# Patient Record
Sex: MALE | Race: WHITE | Employment: FULL TIME | URBAN - METROPOLITAN AREA
[De-identification: names, ages, dates, MRNs, and addresses within clinical notes are randomized per-mention and may not be internally consistent; named-entity substitution may affect disease eponyms.]

---

## 2021-01-01 ENCOUNTER — APPOINTMENT (OUTPATIENT)
Dept: GENERAL RADIOLOGY | Age: 56
DRG: 871 | End: 2021-01-01
Payer: COMMERCIAL

## 2021-01-01 ENCOUNTER — ANESTHESIA EVENT (OUTPATIENT)
Dept: ICU | Age: 56
DRG: 871 | End: 2021-01-01
Payer: COMMERCIAL

## 2021-01-01 ENCOUNTER — HOSPITAL ENCOUNTER (OUTPATIENT)
Dept: INFUSION THERAPY | Age: 56
Discharge: HOME | End: 2021-11-03
Attending: INTERNAL MEDICINE

## 2021-01-01 ENCOUNTER — HOSPITAL ENCOUNTER (INPATIENT)
Age: 56
LOS: 8 days | DRG: 871 | End: 2021-11-11
Attending: EMERGENCY MEDICINE
Payer: COMMERCIAL

## 2021-01-01 ENCOUNTER — ANESTHESIA (OUTPATIENT)
Dept: ICU | Age: 56
DRG: 871 | End: 2021-01-01
Payer: COMMERCIAL

## 2021-01-01 VITALS
TEMPERATURE: 101.5 F | OXYGEN SATURATION: 84 % | BODY MASS INDEX: 39.85 KG/M2 | HEART RATE: 113 BPM | DIASTOLIC BLOOD PRESSURE: 85 MMHG | WEIGHT: 294.2 LBS | HEIGHT: 72 IN | RESPIRATION RATE: 48 BRPM | SYSTOLIC BLOOD PRESSURE: 110 MMHG

## 2021-01-01 DIAGNOSIS — U07.1 COVID-19: Primary | ICD-10-CM

## 2021-01-01 DIAGNOSIS — E87.1 HYPONATREMIA: ICD-10-CM

## 2021-01-01 DIAGNOSIS — U07.1 COVID-19: ICD-10-CM

## 2021-01-01 DIAGNOSIS — R09.02 HYPOXIA: ICD-10-CM

## 2021-01-01 LAB
-: NORMAL
ABSOLUTE EOS #: 0 K/UL (ref 0–0.4)
ABSOLUTE EOS #: 0 K/UL (ref 0–0.4)
ABSOLUTE EOS #: 0 K/UL (ref 0–0.44)
ABSOLUTE EOS #: <0.03 K/UL (ref 0–0.44)
ABSOLUTE EOS #: <0.03 K/UL (ref 0–0.44)
ABSOLUTE IMMATURE GRANULOCYTE: 0 K/UL (ref 0–0.3)
ABSOLUTE IMMATURE GRANULOCYTE: 0.03 K/UL (ref 0–0.3)
ABSOLUTE IMMATURE GRANULOCYTE: 0.1 K/UL (ref 0–0.3)
ABSOLUTE IMMATURE GRANULOCYTE: 0.16 K/UL (ref 0–0.3)
ABSOLUTE IMMATURE GRANULOCYTE: 0.18 K/UL (ref 0–0.3)
ABSOLUTE LYMPH #: 0.36 K/UL (ref 1–4.8)
ABSOLUTE LYMPH #: 0.64 K/UL (ref 1.1–3.7)
ABSOLUTE LYMPH #: 0.68 K/UL (ref 1.1–3.7)
ABSOLUTE LYMPH #: 0.7 K/UL (ref 1.1–3.7)
ABSOLUTE LYMPH #: 0.8 K/UL (ref 1.1–3.7)
ABSOLUTE LYMPH #: 0.8 K/UL (ref 1.1–3.7)
ABSOLUTE LYMPH #: 0.82 K/UL (ref 1–4.8)
ABSOLUTE LYMPH #: 1.2 K/UL (ref 1.1–3.7)
ABSOLUTE MONO #: 0.2 K/UL (ref 0.1–0.8)
ABSOLUTE MONO #: 0.21 K/UL (ref 0.1–1.2)
ABSOLUTE MONO #: 0.21 K/UL (ref 0.1–1.2)
ABSOLUTE MONO #: 0.3 K/UL (ref 0.1–1.2)
ABSOLUTE MONO #: 0.46 K/UL (ref 0.1–0.8)
ABSOLUTE MONO #: 0.8 K/UL (ref 0.1–1.2)
ABSOLUTE MONO #: 0.88 K/UL (ref 0.1–1.2)
ABSOLUTE MONO #: 0.9 K/UL (ref 0.1–1.2)
ALBUMIN SERPL-MCNC: 3.2 G/DL (ref 3.5–5.2)
ALBUMIN SERPL-MCNC: 3.3 G/DL (ref 3.5–5.2)
ALBUMIN SERPL-MCNC: 3.5 G/DL (ref 3.5–5.2)
ALBUMIN SERPL-MCNC: 3.6 G/DL (ref 3.5–5.2)
ALBUMIN SERPL-MCNC: 3.6 G/DL (ref 3.5–5.2)
ALBUMIN SERPL-MCNC: 3.7 G/DL (ref 3.5–5.2)
ALBUMIN SERPL-MCNC: 3.7 G/DL (ref 3.5–5.2)
ALBUMIN/GLOBULIN RATIO: 0.9 (ref 1–2.5)
ALBUMIN/GLOBULIN RATIO: 1 (ref 1–2.5)
ALBUMIN/GLOBULIN RATIO: 1.1 (ref 1–2.5)
ALBUMIN/GLOBULIN RATIO: 1.2 (ref 1–2.5)
ALBUMIN/GLOBULIN RATIO: 1.3 (ref 1–2.5)
ALBUMIN/GLOBULIN RATIO: 1.4 (ref 1–2.5)
ALBUMIN/GLOBULIN RATIO: 1.4 (ref 1–2.5)
ALLEN TEST: POSITIVE
ALP BLD-CCNC: 100 U/L (ref 40–129)
ALP BLD-CCNC: 104 U/L (ref 40–129)
ALP BLD-CCNC: 52 U/L (ref 40–129)
ALP BLD-CCNC: 62 U/L (ref 40–129)
ALP BLD-CCNC: 94 U/L (ref 40–129)
ALP BLD-CCNC: 96 U/L (ref 40–129)
ALP BLD-CCNC: 96 U/L (ref 40–129)
ALT SERPL-CCNC: 121 U/L (ref 5–41)
ALT SERPL-CCNC: 134 U/L (ref 5–41)
ALT SERPL-CCNC: 161 U/L (ref 5–41)
ALT SERPL-CCNC: 188 U/L (ref 5–41)
ALT SERPL-CCNC: 236 U/L (ref 5–41)
ALT SERPL-CCNC: 238 U/L (ref 5–41)
ALT SERPL-CCNC: 91 U/L (ref 5–41)
ANION GAP SERPL CALCULATED.3IONS-SCNC: 11 MMOL/L (ref 9–17)
ANION GAP SERPL CALCULATED.3IONS-SCNC: 13 MMOL/L (ref 9–17)
ANION GAP SERPL CALCULATED.3IONS-SCNC: 13 MMOL/L (ref 9–17)
ANION GAP SERPL CALCULATED.3IONS-SCNC: 14 MMOL/L (ref 9–17)
ANION GAP SERPL CALCULATED.3IONS-SCNC: 15 MMOL/L (ref 9–17)
ANION GAP SERPL CALCULATED.3IONS-SCNC: 15 MMOL/L (ref 9–17)
ANION GAP SERPL CALCULATED.3IONS-SCNC: 16 MMOL/L (ref 9–17)
ANION GAP SERPL CALCULATED.3IONS-SCNC: 16 MMOL/L (ref 9–17)
AST SERPL-CCNC: 152 U/L
AST SERPL-CCNC: 188 U/L
AST SERPL-CCNC: 222 U/L
AST SERPL-CCNC: 237 U/L
AST SERPL-CCNC: 257 U/L
AST SERPL-CCNC: 73 U/L
AST SERPL-CCNC: 93 U/L
BASOPHILS # BLD: 0 % (ref 0–2)
BASOPHILS ABSOLUTE: 0 K/UL (ref 0–0.2)
BASOPHILS ABSOLUTE: <0.03 K/UL (ref 0–0.2)
BASOPHILS ABSOLUTE: <0.03 K/UL (ref 0–0.2)
BILIRUB SERPL-MCNC: 0.44 MG/DL (ref 0.3–1.2)
BILIRUB SERPL-MCNC: 0.48 MG/DL (ref 0.3–1.2)
BILIRUB SERPL-MCNC: 0.51 MG/DL (ref 0.3–1.2)
BILIRUB SERPL-MCNC: 0.61 MG/DL (ref 0.3–1.2)
BILIRUB SERPL-MCNC: 0.65 MG/DL (ref 0.3–1.2)
BILIRUB SERPL-MCNC: 0.69 MG/DL (ref 0.3–1.2)
BILIRUB SERPL-MCNC: 0.79 MG/DL (ref 0.3–1.2)
BUN BLDV-MCNC: 14 MG/DL (ref 6–20)
BUN BLDV-MCNC: 15 MG/DL (ref 6–20)
BUN BLDV-MCNC: 16 MG/DL (ref 6–20)
BUN BLDV-MCNC: 17 MG/DL (ref 6–20)
BUN BLDV-MCNC: 18 MG/DL (ref 6–20)
BUN BLDV-MCNC: 18 MG/DL (ref 6–20)
BUN BLDV-MCNC: 21 MG/DL (ref 6–20)
BUN BLDV-MCNC: 21 MG/DL (ref 6–20)
BUN/CREAT BLD: ABNORMAL (ref 9–20)
C-REACTIVE PROTEIN: 24.2 MG/L (ref 0–5)
C-REACTIVE PROTEIN: 86.2 MG/L (ref 0–5)
CALCIUM SERPL-MCNC: 8.1 MG/DL (ref 8.6–10.4)
CALCIUM SERPL-MCNC: 8.2 MG/DL (ref 8.6–10.4)
CALCIUM SERPL-MCNC: 8.2 MG/DL (ref 8.6–10.4)
CALCIUM SERPL-MCNC: 8.3 MG/DL (ref 8.6–10.4)
CALCIUM SERPL-MCNC: 8.3 MG/DL (ref 8.6–10.4)
CALCIUM SERPL-MCNC: 8.6 MG/DL (ref 8.6–10.4)
CALCIUM SERPL-MCNC: 8.6 MG/DL (ref 8.6–10.4)
CALCIUM SERPL-MCNC: 8.7 MG/DL (ref 8.6–10.4)
CHLORIDE BLD-SCNC: 87 MMOL/L (ref 98–107)
CHLORIDE BLD-SCNC: 88 MMOL/L (ref 98–107)
CHLORIDE BLD-SCNC: 89 MMOL/L (ref 98–107)
CHLORIDE BLD-SCNC: 89 MMOL/L (ref 98–107)
CHLORIDE BLD-SCNC: 90 MMOL/L (ref 98–107)
CHLORIDE BLD-SCNC: 93 MMOL/L (ref 98–107)
CHLORIDE BLD-SCNC: 93 MMOL/L (ref 98–107)
CHLORIDE BLD-SCNC: 94 MMOL/L (ref 98–107)
CO2: 21 MMOL/L (ref 20–31)
CO2: 22 MMOL/L (ref 20–31)
CO2: 23 MMOL/L (ref 20–31)
CO2: 24 MMOL/L (ref 20–31)
CO2: 25 MMOL/L (ref 20–31)
CO2: 26 MMOL/L (ref 20–31)
CREAT SERPL-MCNC: 0.77 MG/DL (ref 0.7–1.2)
CREAT SERPL-MCNC: 0.8 MG/DL (ref 0.7–1.2)
CREAT SERPL-MCNC: 0.85 MG/DL (ref 0.7–1.2)
CREAT SERPL-MCNC: 0.86 MG/DL (ref 0.7–1.2)
CREAT SERPL-MCNC: 0.9 MG/DL (ref 0.7–1.2)
CREAT SERPL-MCNC: 0.9 MG/DL (ref 0.7–1.2)
CREAT SERPL-MCNC: 0.91 MG/DL (ref 0.7–1.2)
CREAT SERPL-MCNC: 1.16 MG/DL (ref 0.7–1.2)
D-DIMER QUANTITATIVE: 0.93 MG/L FEU
DIFFERENTIAL TYPE: ABNORMAL
EKG ATRIAL RATE: 84 BPM
EKG P AXIS: 64 DEGREES
EKG P-R INTERVAL: 156 MS
EKG Q-T INTERVAL: 378 MS
EKG QRS DURATION: 84 MS
EKG QTC CALCULATION (BAZETT): 446 MS
EKG R AXIS: -12 DEGREES
EKG T AXIS: 34 DEGREES
EKG VENTRICULAR RATE: 84 BPM
EOSINOPHILS RELATIVE PERCENT: 0 % (ref 1–4)
FERRITIN: 2936 UG/L (ref 30–400)
FIBRINOGEN: 538 MG/DL (ref 140–420)
FIO2: 70
GFR AFRICAN AMERICAN: >60 ML/MIN
GFR NON-AFRICAN AMERICAN: >60 ML/MIN
GFR SERPL CREATININE-BSD FRML MDRD: ABNORMAL ML/MIN/{1.73_M2}
GLUCOSE BLD-MCNC: 107 MG/DL (ref 70–99)
GLUCOSE BLD-MCNC: 110 MG/DL (ref 70–99)
GLUCOSE BLD-MCNC: 120 MG/DL (ref 70–99)
GLUCOSE BLD-MCNC: 132 MG/DL (ref 70–99)
GLUCOSE BLD-MCNC: 135 MG/DL (ref 70–99)
GLUCOSE BLD-MCNC: 137 MG/DL (ref 70–99)
GLUCOSE BLD-MCNC: 142 MG/DL (ref 70–99)
GLUCOSE BLD-MCNC: 95 MG/DL (ref 70–99)
HCT VFR BLD CALC: 46.7 % (ref 40.7–50.3)
HCT VFR BLD CALC: 46.8 % (ref 40.7–50.3)
HCT VFR BLD CALC: 47.1 % (ref 40.7–50.3)
HCT VFR BLD CALC: 47.1 % (ref 40.7–50.3)
HCT VFR BLD CALC: 47.4 % (ref 40.7–50.3)
HCT VFR BLD CALC: 47.7 % (ref 40.7–50.3)
HCT VFR BLD CALC: 49.9 % (ref 40.7–50.3)
HCT VFR BLD CALC: 50.2 % (ref 40.7–50.3)
HEMOGLOBIN: 15.5 G/DL (ref 13–17)
HEMOGLOBIN: 15.6 G/DL (ref 13–17)
HEMOGLOBIN: 15.6 G/DL (ref 13–17)
HEMOGLOBIN: 15.8 G/DL (ref 13–17)
HEMOGLOBIN: 15.8 G/DL (ref 13–17)
HEMOGLOBIN: 16 G/DL (ref 13–17)
HEMOGLOBIN: 16.6 G/DL (ref 13–17)
HEMOGLOBIN: 17.1 G/DL (ref 13–17)
IMMATURE GRANULOCYTES: 0 %
IMMATURE GRANULOCYTES: 1 %
INR BLD: 0.9
LACTATE DEHYDROGENASE: 794 U/L (ref 135–225)
LACTIC ACID, WHOLE BLOOD: 2.9 MMOL/L (ref 0.7–2.1)
LACTIC ACID: ABNORMAL MMOL/L
LYMPHOCYTES # BLD: 13 % (ref 24–43)
LYMPHOCYTES # BLD: 23 % (ref 24–43)
LYMPHOCYTES # BLD: 25 % (ref 24–43)
LYMPHOCYTES # BLD: 4 % (ref 24–43)
LYMPHOCYTES # BLD: 4 % (ref 24–43)
LYMPHOCYTES # BLD: 4 % (ref 24–44)
LYMPHOCYTES # BLD: 6 % (ref 24–43)
LYMPHOCYTES # BLD: 8 % (ref 24–44)
MAGNESIUM: 2.4 MG/DL (ref 1.6–2.6)
MAGNESIUM: 2.5 MG/DL (ref 1.6–2.6)
MCH RBC QN AUTO: 29.8 PG (ref 25.2–33.5)
MCH RBC QN AUTO: 29.8 PG (ref 25.2–33.5)
MCH RBC QN AUTO: 30 PG (ref 25.2–33.5)
MCH RBC QN AUTO: 30.2 PG (ref 25.2–33.5)
MCH RBC QN AUTO: 30.3 PG (ref 25.2–33.5)
MCH RBC QN AUTO: 30.4 PG (ref 25.2–33.5)
MCHC RBC AUTO-ENTMCNC: 32.9 G/DL (ref 28.4–34.8)
MCHC RBC AUTO-ENTMCNC: 32.9 G/DL (ref 28.4–34.8)
MCHC RBC AUTO-ENTMCNC: 33.1 G/DL (ref 28.4–34.8)
MCHC RBC AUTO-ENTMCNC: 33.4 G/DL (ref 28.4–34.8)
MCHC RBC AUTO-ENTMCNC: 33.5 G/DL (ref 28.4–34.8)
MCHC RBC AUTO-ENTMCNC: 33.5 G/DL (ref 28.4–34.8)
MCHC RBC AUTO-ENTMCNC: 33.8 G/DL (ref 28.4–34.8)
MCHC RBC AUTO-ENTMCNC: 34.3 G/DL (ref 28.4–34.8)
MCV RBC AUTO: 88 FL (ref 82.6–102.9)
MCV RBC AUTO: 89.8 FL (ref 82.6–102.9)
MCV RBC AUTO: 89.8 FL (ref 82.6–102.9)
MCV RBC AUTO: 90.3 FL (ref 82.6–102.9)
MCV RBC AUTO: 90.6 FL (ref 82.6–102.9)
MCV RBC AUTO: 91.6 FL (ref 82.6–102.9)
MODE: ABNORMAL
MONOCYTES # BLD: 11 % (ref 3–12)
MONOCYTES # BLD: 2 % (ref 1–7)
MONOCYTES # BLD: 3 % (ref 3–12)
MONOCYTES # BLD: 4 % (ref 3–12)
MONOCYTES # BLD: 5 % (ref 1–7)
MONOCYTES # BLD: 5 % (ref 3–12)
MONOCYTES # BLD: 5 % (ref 3–12)
MONOCYTES # BLD: 7 % (ref 3–12)
MORPHOLOGY: NORMAL
NEGATIVE BASE EXCESS, ART: ABNORMAL (ref 0–2)
NRBC AUTOMATED: 0 PER 100 WBC
O2 DEVICE/FLOW/%: ABNORMAL
PATIENT TEMP: ABNORMAL
PDW BLD-RTO: 13.5 % (ref 11.8–14.4)
PDW BLD-RTO: 13.6 % (ref 11.8–14.4)
PDW BLD-RTO: 13.7 % (ref 11.8–14.4)
PDW BLD-RTO: 13.7 % (ref 11.8–14.4)
PLATELET # BLD: 141 K/UL (ref 138–453)
PLATELET # BLD: 170 K/UL (ref 138–453)
PLATELET # BLD: 211 K/UL (ref 138–453)
PLATELET # BLD: 238 K/UL (ref 138–453)
PLATELET # BLD: 253 K/UL (ref 138–453)
PLATELET # BLD: 75 K/UL (ref 138–453)
PLATELET # BLD: 94 K/UL (ref 138–453)
PLATELET # BLD: ABNORMAL K/UL (ref 138–453)
PLATELET ESTIMATE: ABNORMAL
PLATELET, FLUORESCENCE: 134 K/UL (ref 138–453)
PLATELET, IMMATURE FRACTION: 8.7 % (ref 1.1–10.3)
PMV BLD AUTO: 11.2 FL (ref 8.1–13.5)
PMV BLD AUTO: 11.2 FL (ref 8.1–13.5)
PMV BLD AUTO: 11.4 FL (ref 8.1–13.5)
PMV BLD AUTO: 11.8 FL (ref 8.1–13.5)
PMV BLD AUTO: 12.3 FL (ref 8.1–13.5)
PMV BLD AUTO: 12.3 FL (ref 8.1–13.5)
PMV BLD AUTO: 12.9 FL (ref 8.1–13.5)
PMV BLD AUTO: ABNORMAL FL (ref 8.1–13.5)
POC HCO3: 25.2 MMOL/L (ref 21–28)
POC O2 SATURATION: 89 % (ref 94–98)
POC PCO2 TEMP: ABNORMAL MM HG
POC PCO2: 32 MM HG (ref 35–48)
POC PH TEMP: ABNORMAL
POC PH: 7.5 (ref 7.35–7.45)
POC PO2 TEMP: ABNORMAL MM HG
POC PO2: 50.9 MM HG (ref 83–108)
POSITIVE BASE EXCESS, ART: 3 (ref 0–3)
POTASSIUM SERPL-SCNC: 3.5 MMOL/L (ref 3.7–5.3)
POTASSIUM SERPL-SCNC: 3.5 MMOL/L (ref 3.7–5.3)
POTASSIUM SERPL-SCNC: 4 MMOL/L (ref 3.7–5.3)
POTASSIUM SERPL-SCNC: 4.2 MMOL/L (ref 3.7–5.3)
POTASSIUM SERPL-SCNC: 4.2 MMOL/L (ref 3.7–5.3)
POTASSIUM SERPL-SCNC: 4.3 MMOL/L (ref 3.7–5.3)
POTASSIUM SERPL-SCNC: 4.4 MMOL/L (ref 3.7–5.3)
POTASSIUM SERPL-SCNC: 4.6 MMOL/L (ref 3.7–5.3)
PROCALCITONIN: 1.43 NG/ML
PROTHROMBIN TIME: 10.2 SEC (ref 9.1–12.3)
RBC # BLD: 5.2 M/UL (ref 4.21–5.77)
RBC # BLD: 5.21 M/UL (ref 4.21–5.77)
RBC # BLD: 5.23 M/UL (ref 4.21–5.77)
RBC # BLD: 5.28 M/UL (ref 4.21–5.77)
RBC # BLD: 5.48 M/UL (ref 4.21–5.77)
RBC # BLD: 5.67 M/UL (ref 4.21–5.77)
RBC # BLD: ABNORMAL 10*6/UL
REASON FOR REJECTION: NORMAL
SAMPLE SITE: ABNORMAL
SARS-COV-2, RAPID: DETECTED
SEG NEUTROPHILS: 64 % (ref 36–65)
SEG NEUTROPHILS: 73 % (ref 36–65)
SEG NEUTROPHILS: 83 % (ref 36–65)
SEG NEUTROPHILS: 87 % (ref 36–65)
SEG NEUTROPHILS: 90 % (ref 36–65)
SEG NEUTROPHILS: 90 % (ref 36–65)
SEG NEUTROPHILS: 90 % (ref 36–66)
SEG NEUTROPHILS: 91 % (ref 36–66)
SEGMENTED NEUTROPHILS ABSOLUTE COUNT: 1.72 K/UL (ref 1.5–8.1)
SEGMENTED NEUTROPHILS ABSOLUTE COUNT: 11.63 K/UL (ref 1.5–8.1)
SEGMENTED NEUTROPHILS ABSOLUTE COUNT: 14.4 K/UL (ref 1.5–8.1)
SEGMENTED NEUTROPHILS ABSOLUTE COUNT: 15.84 K/UL (ref 1.5–8.1)
SEGMENTED NEUTROPHILS ABSOLUTE COUNT: 3.79 K/UL (ref 1.5–8.1)
SEGMENTED NEUTROPHILS ABSOLUTE COUNT: 5.18 K/UL (ref 1.5–8.1)
SEGMENTED NEUTROPHILS ABSOLUTE COUNT: 8.28 K/UL (ref 1.8–7.7)
SEGMENTED NEUTROPHILS ABSOLUTE COUNT: 9.18 K/UL (ref 1.8–7.7)
SODIUM BLD-SCNC: 124 MMOL/L (ref 135–144)
SODIUM BLD-SCNC: 126 MMOL/L (ref 135–144)
SODIUM BLD-SCNC: 127 MMOL/L (ref 135–144)
SODIUM BLD-SCNC: 129 MMOL/L (ref 135–144)
SODIUM BLD-SCNC: 130 MMOL/L (ref 135–144)
SODIUM BLD-SCNC: 131 MMOL/L (ref 135–144)
SPECIMEN DESCRIPTION: ABNORMAL
TCO2 (CALC), ART: ABNORMAL MMOL/L (ref 22–29)
TOTAL PROTEIN: 6.2 G/DL (ref 6.4–8.3)
TOTAL PROTEIN: 6.4 G/DL (ref 6.4–8.3)
TOTAL PROTEIN: 6.6 G/DL (ref 6.4–8.3)
TOTAL PROTEIN: 6.7 G/DL (ref 6.4–8.3)
TOTAL PROTEIN: 7 G/DL (ref 6.4–8.3)
TROPONIN INTERP: NORMAL
TROPONIN T: NORMAL NG/ML
TROPONIN, HIGH SENSITIVITY: <6 NG/L (ref 0–22)
VITAMIN D 25-HYDROXY: 92.9 NG/ML (ref 30–100)
WBC # BLD: 10.2 K/UL (ref 3.5–11.3)
WBC # BLD: 13.4 K/UL (ref 3.5–11.3)
WBC # BLD: 16 K/UL (ref 3.5–11.3)
WBC # BLD: 17.6 K/UL (ref 3.5–11.3)
WBC # BLD: 2.7 K/UL (ref 3.5–11.3)
WBC # BLD: 5.2 K/UL (ref 3.5–11.3)
WBC # BLD: 6.2 K/UL (ref 3.5–11.3)
WBC # BLD: 9.1 K/UL (ref 3.5–11.3)
WBC # BLD: ABNORMAL 10*3/UL
ZZ NTE CLEAN UP: ORDERED TEST: NORMAL
ZZ NTE WITH NAME CLEAN UP: SPECIMEN SOURCE: NORMAL

## 2021-01-01 PROCEDURE — 6360000002 HC RX W HCPCS: Performed by: STUDENT IN AN ORGANIZED HEALTH CARE EDUCATION/TRAINING PROGRAM

## 2021-01-01 PROCEDURE — 2060000000 HC ICU INTERMEDIATE R&B

## 2021-01-01 PROCEDURE — 83735 ASSAY OF MAGNESIUM: CPT

## 2021-01-01 PROCEDURE — 6360000002 HC RX W HCPCS: Performed by: FAMILY MEDICINE

## 2021-01-01 PROCEDURE — 2580000003 HC RX 258: Performed by: FAMILY MEDICINE

## 2021-01-01 PROCEDURE — 99291 CRITICAL CARE FIRST HOUR: CPT | Performed by: INTERNAL MEDICINE

## 2021-01-01 PROCEDURE — 80053 COMPREHEN METABOLIC PANEL: CPT

## 2021-01-01 PROCEDURE — 2500000003 HC RX 250 WO HCPCS: Performed by: INTERNAL MEDICINE

## 2021-01-01 PROCEDURE — 99497 ADVNCD CARE PLAN 30 MIN: CPT | Performed by: STUDENT IN AN ORGANIZED HEALTH CARE EDUCATION/TRAINING PROGRAM

## 2021-01-01 PROCEDURE — 85055 RETICULATED PLATELET ASSAY: CPT

## 2021-01-01 PROCEDURE — 71045 X-RAY EXAM CHEST 1 VIEW: CPT

## 2021-01-01 PROCEDURE — 99233 SBSQ HOSP IP/OBS HIGH 50: CPT | Performed by: INTERNAL MEDICINE

## 2021-01-01 PROCEDURE — 85025 COMPLETE CBC W/AUTO DIFF WBC: CPT

## 2021-01-01 PROCEDURE — 2580000003 HC RX 258: Performed by: STUDENT IN AN ORGANIZED HEALTH CARE EDUCATION/TRAINING PROGRAM

## 2021-01-01 PROCEDURE — 6370000000 HC RX 637 (ALT 250 FOR IP): Performed by: STUDENT IN AN ORGANIZED HEALTH CARE EDUCATION/TRAINING PROGRAM

## 2021-01-01 PROCEDURE — 36415 COLL VENOUS BLD VENIPUNCTURE: CPT

## 2021-01-01 PROCEDURE — 94761 N-INVAS EAR/PLS OXIMETRY MLT: CPT

## 2021-01-01 PROCEDURE — 86140 C-REACTIVE PROTEIN: CPT

## 2021-01-01 PROCEDURE — 97530 THERAPEUTIC ACTIVITIES: CPT

## 2021-01-01 PROCEDURE — 31500 INSERT EMERGENCY AIRWAY: CPT | Performed by: NURSE ANESTHETIST, CERTIFIED REGISTERED

## 2021-01-01 PROCEDURE — 2700000000 HC OXYGEN THERAPY PER DAY

## 2021-01-01 PROCEDURE — 85610 PROTHROMBIN TIME: CPT

## 2021-01-01 PROCEDURE — 6370000000 HC RX 637 (ALT 250 FOR IP): Performed by: NURSE PRACTITIONER

## 2021-01-01 PROCEDURE — 99232 SBSQ HOSP IP/OBS MODERATE 35: CPT | Performed by: STUDENT IN AN ORGANIZED HEALTH CARE EDUCATION/TRAINING PROGRAM

## 2021-01-01 PROCEDURE — 6360000002 HC RX W HCPCS

## 2021-01-01 PROCEDURE — 85379 FIBRIN DEGRADATION QUANT: CPT

## 2021-01-01 PROCEDURE — 82728 ASSAY OF FERRITIN: CPT

## 2021-01-01 PROCEDURE — 6360000002 HC RX W HCPCS: Performed by: NURSE PRACTITIONER

## 2021-01-01 PROCEDURE — 83615 LACTATE (LD) (LDH) ENZYME: CPT

## 2021-01-01 PROCEDURE — 96374 THER/PROPH/DIAG INJ IV PUSH: CPT

## 2021-01-01 PROCEDURE — 99222 1ST HOSP IP/OBS MODERATE 55: CPT | Performed by: INTERNAL MEDICINE

## 2021-01-01 PROCEDURE — 6370000000 HC RX 637 (ALT 250 FOR IP): Performed by: INTERNAL MEDICINE

## 2021-01-01 PROCEDURE — 6360000002 HC RX W HCPCS: Performed by: EMERGENCY MEDICINE

## 2021-01-01 PROCEDURE — 2000000000 HC ICU R&B

## 2021-01-01 PROCEDURE — 99233 SBSQ HOSP IP/OBS HIGH 50: CPT | Performed by: STUDENT IN AN ORGANIZED HEALTH CARE EDUCATION/TRAINING PROGRAM

## 2021-01-01 PROCEDURE — 6370000000 HC RX 637 (ALT 250 FOR IP): Performed by: FAMILY MEDICINE

## 2021-01-01 PROCEDURE — 82306 VITAMIN D 25 HYDROXY: CPT

## 2021-01-01 PROCEDURE — 99232 SBSQ HOSP IP/OBS MODERATE 35: CPT | Performed by: INTERNAL MEDICINE

## 2021-01-01 PROCEDURE — 99223 1ST HOSP IP/OBS HIGH 75: CPT | Performed by: STUDENT IN AN ORGANIZED HEALTH CARE EDUCATION/TRAINING PROGRAM

## 2021-01-01 PROCEDURE — 97162 PT EVAL MOD COMPLEX 30 MIN: CPT

## 2021-01-01 PROCEDURE — 94660 CPAP INITIATION&MGMT: CPT

## 2021-01-01 PROCEDURE — 84145 PROCALCITONIN (PCT): CPT

## 2021-01-01 PROCEDURE — 2500000003 HC RX 250 WO HCPCS

## 2021-01-01 PROCEDURE — APPSS30 APP SPLIT SHARED TIME 16-30 MINUTES: Performed by: NURSE PRACTITIONER

## 2021-01-01 PROCEDURE — 6360000002 HC RX W HCPCS: Performed by: INTERNAL MEDICINE

## 2021-01-01 PROCEDURE — 93005 ELECTROCARDIOGRAM TRACING: CPT | Performed by: EMERGENCY MEDICINE

## 2021-01-01 PROCEDURE — XW043H5 INTRODUCTION OF TOCILIZUMAB INTO CENTRAL VEIN, PERCUTANEOUS APPROACH, NEW TECHNOLOGY GROUP 5: ICD-10-PCS | Performed by: INTERNAL MEDICINE

## 2021-01-01 PROCEDURE — 2580000003 HC RX 258

## 2021-01-01 PROCEDURE — 80048 BASIC METABOLIC PNL TOTAL CA: CPT

## 2021-01-01 PROCEDURE — 99285 EMERGENCY DEPT VISIT HI MDM: CPT

## 2021-01-01 PROCEDURE — 87635 SARS-COV-2 COVID-19 AMP PRB: CPT

## 2021-01-01 PROCEDURE — 84484 ASSAY OF TROPONIN QUANT: CPT

## 2021-01-01 PROCEDURE — 87040 BLOOD CULTURE FOR BACTERIA: CPT

## 2021-01-01 PROCEDURE — 82803 BLOOD GASES ANY COMBINATION: CPT

## 2021-01-01 PROCEDURE — APPSS60 APP SPLIT SHARED TIME 46-60 MINUTES: Performed by: NURSE PRACTITIONER

## 2021-01-01 PROCEDURE — 83605 ASSAY OF LACTIC ACID: CPT

## 2021-01-01 PROCEDURE — 94664 DEMO&/EVAL PT USE INHALER: CPT

## 2021-01-01 PROCEDURE — 36620 INSERTION CATHETER ARTERY: CPT | Performed by: NURSE ANESTHETIST, CERTIFIED REGISTERED

## 2021-01-01 PROCEDURE — 03HB33Z INSERTION OF INFUSION DEVICE INTO RIGHT RADIAL ARTERY, PERCUTANEOUS APPROACH: ICD-10-PCS | Performed by: INTERNAL MEDICINE

## 2021-01-01 PROCEDURE — 85384 FIBRINOGEN ACTIVITY: CPT

## 2021-01-01 RX ORDER — ONDANSETRON 4 MG/1
4 TABLET, ORALLY DISINTEGRATING ORAL EVERY 8 HOURS PRN
Status: DISCONTINUED | OUTPATIENT
Start: 2021-01-01 | End: 2021-11-11 | Stop reason: HOSPADM

## 2021-01-01 RX ORDER — FENTANYL CITRATE 50 UG/ML
25 INJECTION, SOLUTION INTRAMUSCULAR; INTRAVENOUS
Status: DISCONTINUED | OUTPATIENT
Start: 2021-01-01 | End: 2021-11-11 | Stop reason: HOSPADM

## 2021-01-01 RX ORDER — ACETAMINOPHEN 650 MG/1
650 SUPPOSITORY RECTAL EVERY 6 HOURS PRN
Status: DISCONTINUED | OUTPATIENT
Start: 2021-01-01 | End: 2021-11-11 | Stop reason: HOSPADM

## 2021-01-01 RX ORDER — EPINEPHRINE 1 MG/ML
0.3 INJECTION, SOLUTION, CONCENTRATE INTRAVENOUS PRN
Status: CANCELLED | OUTPATIENT
Start: 2021-01-01

## 2021-01-01 RX ORDER — ROSUVASTATIN CALCIUM 20 MG/1
40 TABLET, COATED ORAL NIGHTLY
Status: DISCONTINUED | OUTPATIENT
Start: 2021-01-01 | End: 2021-11-11 | Stop reason: HOSPADM

## 2021-01-01 RX ORDER — ACETAMINOPHEN 325 MG/1
650 TABLET ORAL EVERY 6 HOURS PRN
Status: DISCONTINUED | OUTPATIENT
Start: 2021-01-01 | End: 2021-11-11 | Stop reason: HOSPADM

## 2021-01-01 RX ORDER — POTASSIUM CITRATE 10 MEQ/1
10 TABLET, EXTENDED RELEASE ORAL DAILY
COMMUNITY
Start: 2021-01-01

## 2021-01-01 RX ORDER — TAMSULOSIN HYDROCHLORIDE 0.4 MG/1
0.4 CAPSULE ORAL DAILY
COMMUNITY

## 2021-01-01 RX ORDER — DIPHENHYDRAMINE HYDROCHLORIDE 50 MG/ML
50 INJECTION INTRAMUSCULAR; INTRAVENOUS
Status: CANCELLED | OUTPATIENT
Start: 2021-01-01 | End: 2021-01-01

## 2021-01-01 RX ORDER — DEXAMETHASONE SODIUM PHOSPHATE 10 MG/ML
10 INJECTION INTRAMUSCULAR; INTRAVENOUS ONCE
Status: COMPLETED | OUTPATIENT
Start: 2021-01-01 | End: 2021-01-01

## 2021-01-01 RX ORDER — ETOMIDATE 2 MG/ML
INJECTION INTRAVENOUS
Status: DISCONTINUED
Start: 2021-01-01 | End: 2021-11-11 | Stop reason: HOSPADM

## 2021-01-01 RX ORDER — DEXMEDETOMIDINE HYDROCHLORIDE 4 UG/ML
.2-1.4 INJECTION, SOLUTION INTRAVENOUS CONTINUOUS
Status: DISCONTINUED | OUTPATIENT
Start: 2021-01-01 | End: 2021-11-11 | Stop reason: HOSPADM

## 2021-01-01 RX ORDER — LORAZEPAM 2 MG/ML
1 INJECTION INTRAMUSCULAR ONCE
Status: COMPLETED | OUTPATIENT
Start: 2021-01-01 | End: 2021-01-01

## 2021-01-01 RX ORDER — LEVOTHYROXINE SODIUM 150 UG/1
150 CAPSULE ORAL DAILY
COMMUNITY

## 2021-01-01 RX ORDER — LORAZEPAM 2 MG/ML
1 INJECTION INTRAMUSCULAR EVERY 4 HOURS PRN
Status: DISCONTINUED | OUTPATIENT
Start: 2021-01-01 | End: 2021-01-01

## 2021-01-01 RX ORDER — FLUTICASONE FUROATE, UMECLIDINIUM BROMIDE AND VILANTEROL TRIFENATATE 100; 62.5; 25 UG/1; UG/1; UG/1
1 POWDER RESPIRATORY (INHALATION) DAILY
COMMUNITY
Start: 2021-01-01

## 2021-01-01 RX ORDER — LORAZEPAM 1 MG/1
1 TABLET ORAL EVERY 4 HOURS PRN
Status: DISCONTINUED | OUTPATIENT
Start: 2021-01-01 | End: 2021-01-01

## 2021-01-01 RX ORDER — METOPROLOL TARTRATE 5 MG/5ML
2.5 INJECTION INTRAVENOUS EVERY 6 HOURS
Status: DISCONTINUED | OUTPATIENT
Start: 2021-01-01 | End: 2021-11-11 | Stop reason: HOSPADM

## 2021-01-01 RX ORDER — METHYLPREDNISOLONE SODIUM SUCCINATE 125 MG/2ML
125 INJECTION, POWDER, LYOPHILIZED, FOR SOLUTION INTRAMUSCULAR; INTRAVENOUS
Status: CANCELLED | OUTPATIENT
Start: 2021-01-01 | End: 2021-01-01

## 2021-01-01 RX ORDER — POLYETHYLENE GLYCOL 3350 17 G/17G
17 POWDER, FOR SOLUTION ORAL DAILY PRN
Status: DISCONTINUED | OUTPATIENT
Start: 2021-01-01 | End: 2021-11-11 | Stop reason: HOSPADM

## 2021-01-01 RX ORDER — FUROSEMIDE 10 MG/ML
20 INJECTION INTRAMUSCULAR; INTRAVENOUS ONCE
Status: COMPLETED | OUTPATIENT
Start: 2021-01-01 | End: 2021-01-01

## 2021-01-01 RX ORDER — SODIUM CHLORIDE 0.9 % (FLUSH) 0.9 %
5-40 SYRINGE (ML) INJECTION PRN
Status: DISCONTINUED | OUTPATIENT
Start: 2021-01-01 | End: 2021-11-11 | Stop reason: HOSPADM

## 2021-01-01 RX ORDER — ONDANSETRON 4 MG/1
1 TABLET, ORALLY DISINTEGRATING ORAL EVERY 6 HOURS PRN
COMMUNITY
Start: 2021-01-01

## 2021-01-01 RX ORDER — SODIUM CHLORIDE 9 MG/ML
INJECTION, SOLUTION INTRAVENOUS CONTINUOUS PRN
Status: CANCELLED | OUTPATIENT
Start: 2021-01-01 | End: 2021-01-01

## 2021-01-01 RX ORDER — TAMSULOSIN HYDROCHLORIDE 0.4 MG/1
0.4 CAPSULE ORAL DAILY
Status: DISCONTINUED | OUTPATIENT
Start: 2021-01-01 | End: 2021-11-11 | Stop reason: HOSPADM

## 2021-01-01 RX ORDER — DEXAMETHASONE SODIUM PHOSPHATE 10 MG/ML
10 INJECTION INTRAMUSCULAR; INTRAVENOUS ONCE
Status: DISCONTINUED | OUTPATIENT
Start: 2021-01-01 | End: 2021-01-01

## 2021-01-01 RX ORDER — POTASSIUM CHLORIDE 20 MEQ/1
40 TABLET, EXTENDED RELEASE ORAL 2 TIMES DAILY WITH MEALS
Status: COMPLETED | OUTPATIENT
Start: 2021-01-01 | End: 2021-01-01

## 2021-01-01 RX ORDER — PHENOL 1.4 %
10 AEROSOL, SPRAY (ML) MUCOUS MEMBRANE NIGHTLY PRN
COMMUNITY

## 2021-01-01 RX ORDER — MORPHINE SULFATE 4 MG/ML
4 INJECTION, SOLUTION INTRAMUSCULAR; INTRAVENOUS ONCE
Status: COMPLETED | OUTPATIENT
Start: 2021-01-01 | End: 2021-01-01

## 2021-01-01 RX ORDER — LEVOCETIRIZINE DIHYDROCHLORIDE 5 MG/1
5 TABLET, FILM COATED ORAL EVERY EVENING
COMMUNITY

## 2021-01-01 RX ORDER — DEXAMETHASONE SODIUM PHOSPHATE 10 MG/ML
6 INJECTION INTRAMUSCULAR; INTRAVENOUS EVERY 24 HOURS
Status: DISCONTINUED | OUTPATIENT
Start: 2021-01-01 | End: 2021-01-01

## 2021-01-01 RX ORDER — ONDANSETRON 2 MG/ML
4 INJECTION INTRAMUSCULAR; INTRAVENOUS EVERY 6 HOURS PRN
Status: DISCONTINUED | OUTPATIENT
Start: 2021-01-01 | End: 2021-11-11 | Stop reason: HOSPADM

## 2021-01-01 RX ORDER — ALLOPURINOL 300 MG/1
300 TABLET ORAL DAILY
COMMUNITY
Start: 2021-01-01

## 2021-01-01 RX ORDER — LEVOTHYROXINE SODIUM 0.07 MG/1
150 TABLET ORAL DAILY
Status: DISCONTINUED | OUTPATIENT
Start: 2021-01-01 | End: 2021-11-11 | Stop reason: HOSPADM

## 2021-01-01 RX ORDER — METOPROLOL TARTRATE 5 MG/5ML
INJECTION INTRAVENOUS
Status: COMPLETED
Start: 2021-01-01 | End: 2021-01-01

## 2021-01-01 RX ORDER — PANTOPRAZOLE SODIUM 40 MG/1
40 TABLET, DELAYED RELEASE ORAL
Status: DISCONTINUED | OUTPATIENT
Start: 2021-01-01 | End: 2021-11-11 | Stop reason: HOSPADM

## 2021-01-01 RX ORDER — LORAZEPAM 2 MG/ML
2 INJECTION INTRAMUSCULAR
Status: DISCONTINUED | OUTPATIENT
Start: 2021-01-01 | End: 2021-11-11 | Stop reason: HOSPADM

## 2021-01-01 RX ORDER — SODIUM CHLORIDE 0.9 % (FLUSH) 0.9 %
5-40 SYRINGE (ML) INJECTION EVERY 12 HOURS SCHEDULED
Status: DISCONTINUED | OUTPATIENT
Start: 2021-01-01 | End: 2021-11-11 | Stop reason: HOSPADM

## 2021-01-01 RX ORDER — OLMESARTAN MEDOXOMIL, AMLODIPINE AND HYDROCHLOROTHIAZIDE TABLET 40/10/25 MG 40; 10; 25 MG/1; MG/1; MG/1
1 TABLET ORAL
COMMUNITY
Start: 2021-01-01

## 2021-01-01 RX ORDER — FUROSEMIDE 10 MG/ML
40 INJECTION INTRAMUSCULAR; INTRAVENOUS ONCE
Status: COMPLETED | OUTPATIENT
Start: 2021-01-01 | End: 2021-01-01

## 2021-01-01 RX ORDER — ROSUVASTATIN CALCIUM 40 MG/1
40 TABLET, COATED ORAL NIGHTLY
COMMUNITY
Start: 2021-01-01

## 2021-01-01 RX ORDER — CARVEDILOL 6.25 MG/1
6.25 TABLET ORAL 2 TIMES DAILY
COMMUNITY
Start: 2021-01-01

## 2021-01-01 RX ORDER — CALCIUM CHLORIDE 100 MG/ML
INJECTION INTRAVENOUS; INTRAVENTRICULAR
Status: DISCONTINUED
Start: 2021-01-01 | End: 2021-11-11 | Stop reason: HOSPADM

## 2021-01-01 RX ORDER — SODIUM CHLORIDE 9 MG/ML
25 INJECTION, SOLUTION INTRAVENOUS PRN
Status: DISCONTINUED | OUTPATIENT
Start: 2021-01-01 | End: 2021-11-11 | Stop reason: HOSPADM

## 2021-01-01 RX ORDER — LORAZEPAM 0.5 MG/1
0.5 TABLET ORAL EVERY 4 HOURS PRN
Status: DISCONTINUED | OUTPATIENT
Start: 2021-01-01 | End: 2021-01-01

## 2021-01-01 RX ORDER — POTASSIUM CHLORIDE 20 MEQ/1
TABLET, EXTENDED RELEASE ORAL
Status: DISPENSED
Start: 2021-01-01 | End: 2021-01-01

## 2021-01-01 RX ORDER — DEXAMETHASONE SODIUM PHOSPHATE 10 MG/ML
10 INJECTION INTRAMUSCULAR; INTRAVENOUS EVERY 24 HOURS
Status: DISCONTINUED | OUTPATIENT
Start: 2021-11-11 | End: 2021-11-11 | Stop reason: HOSPADM

## 2021-01-01 RX ORDER — SODIUM CHLORIDE 9 MG/ML
100 INJECTION, SOLUTION INTRAVENOUS CONTINUOUS PRN
Status: CANCELLED | OUTPATIENT
Start: 2021-01-01

## 2021-01-01 RX ORDER — BENZONATATE 100 MG/1
200 CAPSULE ORAL 3 TIMES DAILY PRN
Status: DISCONTINUED | OUTPATIENT
Start: 2021-01-01 | End: 2021-11-11 | Stop reason: HOSPADM

## 2021-01-01 RX ORDER — GUAIFENESIN DEXTROMETHORPHAN HYDROBROMIDE ORAL SOLUTION 10; 100 MG/5ML; MG/5ML
10 SOLUTION ORAL EVERY 4 HOURS PRN
Status: DISCONTINUED | OUTPATIENT
Start: 2021-01-01 | End: 2021-11-11 | Stop reason: HOSPADM

## 2021-01-01 RX ORDER — PROPOFOL 10 MG/ML
5-50 INJECTION, EMULSION INTRAVENOUS
Status: DISCONTINUED | OUTPATIENT
Start: 2021-01-01 | End: 2021-11-11 | Stop reason: HOSPADM

## 2021-01-01 RX ORDER — VECURONIUM BROMIDE 1 MG/ML
INJECTION, POWDER, LYOPHILIZED, FOR SOLUTION INTRAVENOUS
Status: DISCONTINUED
Start: 2021-01-01 | End: 2021-11-11 | Stop reason: HOSPADM

## 2021-01-01 RX ORDER — LORAZEPAM 2 MG/ML
INJECTION INTRAMUSCULAR
Status: COMPLETED
Start: 2021-01-01 | End: 2021-01-01

## 2021-01-01 RX ORDER — CARVEDILOL 6.25 MG/1
6.25 TABLET ORAL 2 TIMES DAILY
Status: DISCONTINUED | OUTPATIENT
Start: 2021-01-01 | End: 2021-11-11 | Stop reason: HOSPADM

## 2021-01-01 RX ORDER — PROPOFOL 10 MG/ML
INJECTION, EMULSION INTRAVENOUS
Status: DISCONTINUED
Start: 2021-01-01 | End: 2021-11-11 | Stop reason: HOSPADM

## 2021-01-01 RX ORDER — NOREPINEPHRINE BIT/0.9 % NACL 16MG/250ML
INFUSION BOTTLE (ML) INTRAVENOUS
Status: DISCONTINUED
Start: 2021-01-01 | End: 2021-11-11 | Stop reason: HOSPADM

## 2021-01-01 RX ADMIN — ENOXAPARIN SODIUM 30 MG: 30 INJECTION SUBCUTANEOUS at 20:49

## 2021-01-01 RX ADMIN — ENOXAPARIN SODIUM 30 MG: 30 INJECTION SUBCUTANEOUS at 08:00

## 2021-01-01 RX ADMIN — TAMSULOSIN HYDROCHLORIDE 0.4 MG: 0.4 CAPSULE ORAL at 07:58

## 2021-01-01 RX ADMIN — ENOXAPARIN SODIUM 30 MG: 30 INJECTION SUBCUTANEOUS at 21:27

## 2021-01-01 RX ADMIN — LEVOTHYROXINE SODIUM 150 MCG: 75 TABLET ORAL at 06:09

## 2021-01-01 RX ADMIN — CARVEDILOL 6.25 MG: 6.25 TABLET, FILM COATED ORAL at 20:49

## 2021-01-01 RX ADMIN — DEXMEDETOMIDINE HYDROCHLORIDE 0.8 MCG/KG/HR: 4 INJECTION, SOLUTION INTRAVENOUS at 20:45

## 2021-01-01 RX ADMIN — TAMSULOSIN HYDROCHLORIDE 0.4 MG: 0.4 CAPSULE ORAL at 07:46

## 2021-01-01 RX ADMIN — SODIUM CHLORIDE, PRESERVATIVE FREE 10 ML: 5 INJECTION INTRAVENOUS at 07:45

## 2021-01-01 RX ADMIN — DEXMEDETOMIDINE HYDROCHLORIDE 0.2 MCG/KG/HR: 4 INJECTION, SOLUTION INTRAVENOUS at 17:43

## 2021-01-01 RX ADMIN — POTASSIUM CHLORIDE 40 MEQ: 1500 TABLET, EXTENDED RELEASE ORAL at 16:23

## 2021-01-01 RX ADMIN — ENOXAPARIN SODIUM 30 MG: 30 INJECTION SUBCUTANEOUS at 08:27

## 2021-01-01 RX ADMIN — ENOXAPARIN SODIUM 30 MG: 30 INJECTION SUBCUTANEOUS at 08:04

## 2021-01-01 RX ADMIN — BENZONATATE 200 MG: 100 CAPSULE ORAL at 11:54

## 2021-01-01 RX ADMIN — BENZONATATE 200 MG: 100 CAPSULE ORAL at 11:46

## 2021-01-01 RX ADMIN — SODIUM CHLORIDE, PRESERVATIVE FREE 10 ML: 5 INJECTION INTRAVENOUS at 09:30

## 2021-01-01 RX ADMIN — LORAZEPAM 1 MG: 1 TABLET ORAL at 18:17

## 2021-01-01 RX ADMIN — SODIUM CHLORIDE, PRESERVATIVE FREE 10 ML: 5 INJECTION INTRAVENOUS at 20:30

## 2021-01-01 RX ADMIN — FUROSEMIDE 20 MG: 10 INJECTION, SOLUTION INTRAMUSCULAR; INTRAVENOUS at 08:03

## 2021-01-01 RX ADMIN — PANTOPRAZOLE SODIUM 40 MG: 40 TABLET, DELAYED RELEASE ORAL at 05:47

## 2021-01-01 RX ADMIN — ENOXAPARIN SODIUM 30 MG: 30 INJECTION SUBCUTANEOUS at 07:45

## 2021-01-01 RX ADMIN — ACETAMINOPHEN 650 MG: 325 TABLET ORAL at 19:05

## 2021-01-01 RX ADMIN — LEVOTHYROXINE SODIUM 150 MCG: 75 TABLET ORAL at 05:46

## 2021-01-01 RX ADMIN — DEXAMETHASONE SODIUM PHOSPHATE 10 MG: 10 INJECTION INTRAMUSCULAR; INTRAVENOUS at 12:03

## 2021-01-01 RX ADMIN — TAMSULOSIN HYDROCHLORIDE 0.4 MG: 0.4 CAPSULE ORAL at 08:37

## 2021-01-01 RX ADMIN — ACETAMINOPHEN 650 MG: 325 TABLET ORAL at 07:58

## 2021-01-01 RX ADMIN — LEVOTHYROXINE SODIUM 150 MCG: 75 TABLET ORAL at 07:58

## 2021-01-01 RX ADMIN — CARVEDILOL 6.25 MG: 6.25 TABLET, FILM COATED ORAL at 07:58

## 2021-01-01 RX ADMIN — SODIUM CHLORIDE, PRESERVATIVE FREE 10 ML: 5 INJECTION INTRAVENOUS at 21:45

## 2021-01-01 RX ADMIN — ROSUVASTATIN CALCIUM 40 MG: 20 TABLET, FILM COATED ORAL at 21:01

## 2021-01-01 RX ADMIN — ACETAMINOPHEN 650 MG: 325 TABLET ORAL at 19:50

## 2021-01-01 RX ADMIN — DEXAMETHASONE SODIUM PHOSPHATE 20 MG: 10 INJECTION INTRAMUSCULAR; INTRAVENOUS at 17:15

## 2021-01-01 RX ADMIN — CARVEDILOL 6.25 MG: 6.25 TABLET, FILM COATED ORAL at 21:01

## 2021-01-01 RX ADMIN — LEVOTHYROXINE SODIUM 150 MCG: 75 TABLET ORAL at 09:20

## 2021-01-01 RX ADMIN — MORPHINE SULFATE 4 MG: 4 INJECTION INTRAVENOUS at 20:19

## 2021-01-01 RX ADMIN — CARVEDILOL 6.25 MG: 6.25 TABLET, FILM COATED ORAL at 20:07

## 2021-01-01 RX ADMIN — SODIUM CHLORIDE, PRESERVATIVE FREE 10 ML: 5 INJECTION INTRAVENOUS at 20:02

## 2021-01-01 RX ADMIN — ENOXAPARIN SODIUM 30 MG: 30 INJECTION SUBCUTANEOUS at 08:37

## 2021-01-01 RX ADMIN — ENOXAPARIN SODIUM 30 MG: 30 INJECTION SUBCUTANEOUS at 20:24

## 2021-01-01 RX ADMIN — CARVEDILOL 6.25 MG: 6.25 TABLET, FILM COATED ORAL at 08:04

## 2021-01-01 RX ADMIN — ROSUVASTATIN CALCIUM 40 MG: 20 TABLET, FILM COATED ORAL at 20:02

## 2021-01-01 RX ADMIN — SODIUM CHLORIDE, PRESERVATIVE FREE 10 ML: 5 INJECTION INTRAVENOUS at 08:04

## 2021-01-01 RX ADMIN — CARVEDILOL 6.25 MG: 6.25 TABLET, FILM COATED ORAL at 08:37

## 2021-01-01 RX ADMIN — TAMSULOSIN HYDROCHLORIDE 0.4 MG: 0.4 CAPSULE ORAL at 08:00

## 2021-01-01 RX ADMIN — LEVOTHYROXINE SODIUM 150 MCG: 75 TABLET ORAL at 07:46

## 2021-01-01 RX ADMIN — SODIUM CHLORIDE, PRESERVATIVE FREE 10 ML: 5 INJECTION INTRAVENOUS at 07:59

## 2021-01-01 RX ADMIN — CARVEDILOL 6.25 MG: 6.25 TABLET, FILM COATED ORAL at 09:20

## 2021-01-01 RX ADMIN — PANTOPRAZOLE SODIUM 40 MG: 40 TABLET, DELAYED RELEASE ORAL at 06:09

## 2021-01-01 RX ADMIN — CARVEDILOL 6.25 MG: 6.25 TABLET, FILM COATED ORAL at 20:02

## 2021-01-01 RX ADMIN — ENOXAPARIN SODIUM 30 MG: 30 INJECTION SUBCUTANEOUS at 20:00

## 2021-01-01 RX ADMIN — METOPROLOL TARTRATE 2.5 MG: 1 INJECTION, SOLUTION INTRAVENOUS at 20:25

## 2021-01-01 RX ADMIN — SODIUM CHLORIDE, PRESERVATIVE FREE 10 ML: 5 INJECTION INTRAVENOUS at 20:26

## 2021-01-01 RX ADMIN — PANTOPRAZOLE SODIUM 40 MG: 40 TABLET, DELAYED RELEASE ORAL at 08:27

## 2021-01-01 RX ADMIN — SODIUM CHLORIDE, PRESERVATIVE FREE 10 ML: 5 INJECTION INTRAVENOUS at 08:01

## 2021-01-01 RX ADMIN — LORAZEPAM 2 MG: 2 INJECTION INTRAMUSCULAR; INTRAVENOUS at 20:41

## 2021-01-01 RX ADMIN — PANTOPRAZOLE SODIUM 40 MG: 40 TABLET, DELAYED RELEASE ORAL at 07:46

## 2021-01-01 RX ADMIN — CARVEDILOL 6.25 MG: 6.25 TABLET, FILM COATED ORAL at 20:00

## 2021-01-01 RX ADMIN — POTASSIUM CHLORIDE 40 MEQ: 1500 TABLET, EXTENDED RELEASE ORAL at 08:27

## 2021-01-01 RX ADMIN — TAMSULOSIN HYDROCHLORIDE 0.4 MG: 0.4 CAPSULE ORAL at 09:20

## 2021-01-01 RX ADMIN — SODIUM CHLORIDE, PRESERVATIVE FREE 10 ML: 5 INJECTION INTRAVENOUS at 21:52

## 2021-01-01 RX ADMIN — ENOXAPARIN SODIUM 30 MG: 30 INJECTION SUBCUTANEOUS at 20:07

## 2021-01-01 RX ADMIN — DEXAMETHASONE SODIUM PHOSPHATE 20 MG: 10 INJECTION INTRAMUSCULAR; INTRAVENOUS at 17:41

## 2021-01-01 RX ADMIN — DEXAMETHASONE SODIUM PHOSPHATE 20 MG: 10 INJECTION INTRAMUSCULAR; INTRAVENOUS at 18:56

## 2021-01-01 RX ADMIN — CARVEDILOL 6.25 MG: 6.25 TABLET, FILM COATED ORAL at 20:30

## 2021-01-01 RX ADMIN — BENZONATATE 200 MG: 100 CAPSULE ORAL at 03:28

## 2021-01-01 RX ADMIN — BENZONATATE 200 MG: 100 CAPSULE ORAL at 04:23

## 2021-01-01 RX ADMIN — PANTOPRAZOLE SODIUM 40 MG: 40 TABLET, DELAYED RELEASE ORAL at 06:18

## 2021-01-01 RX ADMIN — LEVOTHYROXINE SODIUM 150 MCG: 75 TABLET ORAL at 06:18

## 2021-01-01 RX ADMIN — ENOXAPARIN SODIUM 30 MG: 30 INJECTION SUBCUTANEOUS at 09:19

## 2021-01-01 RX ADMIN — TAMSULOSIN HYDROCHLORIDE 0.4 MG: 0.4 CAPSULE ORAL at 08:27

## 2021-01-01 RX ADMIN — METOPROLOL TARTRATE 2.5 MG: 5 INJECTION INTRAVENOUS at 20:25

## 2021-01-01 RX ADMIN — LORAZEPAM 2 MG: 2 INJECTION INTRAMUSCULAR at 20:41

## 2021-01-01 RX ADMIN — LEVOTHYROXINE SODIUM 150 MCG: 75 TABLET ORAL at 08:27

## 2021-01-01 RX ADMIN — FUROSEMIDE 20 MG: 10 INJECTION, SOLUTION INTRAMUSCULAR; INTRAVENOUS at 19:05

## 2021-01-01 RX ADMIN — TAMSULOSIN HYDROCHLORIDE 0.4 MG: 0.4 CAPSULE ORAL at 08:04

## 2021-01-01 RX ADMIN — CARVEDILOL 6.25 MG: 6.25 TABLET, FILM COATED ORAL at 08:27

## 2021-01-01 RX ADMIN — SODIUM CHLORIDE, PRESERVATIVE FREE 10 ML: 5 INJECTION INTRAVENOUS at 20:07

## 2021-01-01 RX ADMIN — TAMSULOSIN HYDROCHLORIDE 0.4 MG: 0.4 CAPSULE ORAL at 21:01

## 2021-01-01 RX ADMIN — FUROSEMIDE 40 MG: 10 INJECTION, SOLUTION INTRAMUSCULAR; INTRAVENOUS at 08:27

## 2021-01-01 RX ADMIN — DEXAMETHASONE SODIUM PHOSPHATE 20 MG: 10 INJECTION INTRAMUSCULAR; INTRAVENOUS at 18:25

## 2021-01-01 RX ADMIN — GUAIFENESIN DEXTROMETHORPHAN HYDROBROMIDE ORAL SOLUTION 10 ML: 200; 20 SOLUTION ORAL at 14:46

## 2021-01-01 RX ADMIN — SODIUM CHLORIDE, PRESERVATIVE FREE 5 ML: 5 INJECTION INTRAVENOUS at 20:49

## 2021-01-01 RX ADMIN — ENOXAPARIN SODIUM 30 MG: 30 INJECTION SUBCUTANEOUS at 20:30

## 2021-01-01 RX ADMIN — ENOXAPARIN SODIUM 30 MG: 30 INJECTION SUBCUTANEOUS at 21:01

## 2021-01-01 RX ADMIN — ROSUVASTATIN CALCIUM 40 MG: 20 TABLET, FILM COATED ORAL at 20:30

## 2021-01-01 RX ADMIN — SODIUM CHLORIDE, PRESERVATIVE FREE 10 ML: 5 INJECTION INTRAVENOUS at 20:00

## 2021-01-01 RX ADMIN — TOCILIZUMAB 810 MG: 180 INJECTION, SOLUTION SUBCUTANEOUS at 21:01

## 2021-01-01 RX ADMIN — ROSUVASTATIN CALCIUM 40 MG: 20 TABLET, FILM COATED ORAL at 21:27

## 2021-01-01 RX ADMIN — PANTOPRAZOLE SODIUM 40 MG: 40 TABLET, DELAYED RELEASE ORAL at 09:20

## 2021-01-01 RX ADMIN — SODIUM CHLORIDE, PRESERVATIVE FREE 10 ML: 5 INJECTION INTRAVENOUS at 09:20

## 2021-01-01 RX ADMIN — LORAZEPAM 1 MG: 2 INJECTION INTRAMUSCULAR; INTRAVENOUS at 16:20

## 2021-01-01 RX ADMIN — DEXAMETHASONE SODIUM PHOSPHATE 20 MG: 10 INJECTION INTRAMUSCULAR; INTRAVENOUS at 18:00

## 2021-01-01 RX ADMIN — CARVEDILOL 6.25 MG: 6.25 TABLET, FILM COATED ORAL at 08:00

## 2021-01-01 RX ADMIN — ROSUVASTATIN CALCIUM 40 MG: 20 TABLET, FILM COATED ORAL at 20:49

## 2021-01-01 RX ADMIN — PANTOPRAZOLE SODIUM 40 MG: 40 TABLET, DELAYED RELEASE ORAL at 07:58

## 2021-01-01 RX ADMIN — CARVEDILOL 6.25 MG: 6.25 TABLET, FILM COATED ORAL at 21:27

## 2021-01-01 RX ADMIN — CARVEDILOL 6.25 MG: 6.25 TABLET, FILM COATED ORAL at 07:46

## 2021-01-01 RX ADMIN — ENOXAPARIN SODIUM 30 MG: 30 INJECTION SUBCUTANEOUS at 20:02

## 2021-01-01 RX ADMIN — ENOXAPARIN SODIUM 30 MG: 30 INJECTION SUBCUTANEOUS at 07:58

## 2021-01-01 ASSESSMENT — ENCOUNTER SYMPTOMS
APNEA: 0
SINUS PAIN: 0
EYE REDNESS: 0
TROUBLE SWALLOWING: 0
TROUBLE SWALLOWING: 0
WHEEZING: 0
COUGH: 1
SORE THROAT: 0
VOMITING: 0
BLOOD IN STOOL: 0
ABDOMINAL PAIN: 0
SHORTNESS OF BREATH: 1
PHOTOPHOBIA: 0
ABDOMINAL PAIN: 0
EYE REDNESS: 0
DIARRHEA: 0
COUGH: 1
BLOOD IN STOOL: 0
DIARRHEA: 0
VOICE CHANGE: 0
SINUS PAIN: 0
TROUBLE SWALLOWING: 0
SORE THROAT: 0
PHOTOPHOBIA: 0
ABDOMINAL PAIN: 0
BLOOD IN STOOL: 0
EYE REDNESS: 0
COUGH: 1
WHEEZING: 0
PHOTOPHOBIA: 0
TROUBLE SWALLOWING: 0
ABDOMINAL PAIN: 0
SORE THROAT: 0
DIARRHEA: 0
SHORTNESS OF BREATH: 1
VOMITING: 0
VOICE CHANGE: 0
EYE REDNESS: 0
VOMITING: 0
VOMITING: 0
ALLERGIC/IMMUNOLOGIC NEGATIVE: 1
NAUSEA: 0
EYE REDNESS: 0
SORE THROAT: 0
COUGH: 1
SORE THROAT: 0
WHEEZING: 0
SINUS PAIN: 0
SHORTNESS OF BREATH: 1
VOMITING: 0
ALLERGIC/IMMUNOLOGIC NEGATIVE: 1
DIARRHEA: 0
SHORTNESS OF BREATH: 1
ALLERGIC/IMMUNOLOGIC NEGATIVE: 1
TROUBLE SWALLOWING: 0
VOMITING: 0
SHORTNESS OF BREATH: 1
BLOOD IN STOOL: 0
COUGH: 1
VOICE CHANGE: 0
COUGH: 1
GASTROINTESTINAL NEGATIVE: 1
EYE PAIN: 0
ABDOMINAL PAIN: 0
ABDOMINAL PAIN: 0
WHEEZING: 0
SHORTNESS OF BREATH: 1
EYE DISCHARGE: 0
DIARRHEA: 0
WHEEZING: 0
PHOTOPHOBIA: 0
BLOOD IN STOOL: 0
SINUS PAIN: 0
SINUS PAIN: 0
PHOTOPHOBIA: 0
VOMITING: 0
ALLERGIC/IMMUNOLOGIC NEGATIVE: 1
BLOOD IN STOOL: 0
WHEEZING: 0
EYE REDNESS: 0
VOICE CHANGE: 0
SHORTNESS OF BREATH: 1
BLOOD IN STOOL: 0
VOICE CHANGE: 0
TACHYPNEA: 1
ALLERGIC/IMMUNOLOGIC NEGATIVE: 1
SORE THROAT: 0
DIARRHEA: 0
DIARRHEA: 0
TROUBLE SWALLOWING: 0
VOICE CHANGE: 0
GASTROINTESTINAL NEGATIVE: 1
COUGH: 1
SINUS PAIN: 0
ALLERGIC/IMMUNOLOGIC NEGATIVE: 1
PHOTOPHOBIA: 0

## 2021-01-01 ASSESSMENT — PAIN SCALES - GENERAL
PAINLEVEL_OUTOF10: 0
PAINLEVEL_OUTOF10: 5
PAINLEVEL_OUTOF10: 0
PAINLEVEL_OUTOF10: 3
PAINLEVEL_OUTOF10: 0
PAINLEVEL_OUTOF10: 3
PAINLEVEL_OUTOF10: 0
PAINLEVEL_OUTOF10: 1
PAINLEVEL_OUTOF10: 0

## 2021-01-01 ASSESSMENT — PAIN DESCRIPTION - LOCATION: LOCATION: GENERALIZED

## 2021-01-01 ASSESSMENT — PAIN DESCRIPTION - PAIN TYPE: TYPE: ACUTE PAIN

## 2021-11-03 PROBLEM — N40.1 BENIGN PROSTATIC HYPERPLASIA WITH LOWER URINARY TRACT SYMPTOMS: Status: ACTIVE | Noted: 2018-02-07

## 2021-11-03 PROBLEM — J96.00 ACUTE RESPIRATORY FAILURE DUE TO COVID-19 (HCC): Status: ACTIVE | Noted: 2021-01-01

## 2021-11-03 PROBLEM — U07.1 COVID-19: Status: ACTIVE | Noted: 2021-01-01

## 2021-11-03 PROBLEM — J12.82 PNEUMONIA DUE TO COVID-19 VIRUS: Status: ACTIVE | Noted: 2021-01-01

## 2021-11-03 PROBLEM — E66.9 OBESITY (BMI 35.0-39.9 WITHOUT COMORBIDITY): Status: ACTIVE | Noted: 2019-02-22

## 2021-11-03 NOTE — CARE COORDINATION
Case Management Initial Discharge Plan  Rosemary Hernandez,             Met with:spouse/SO to discuss discharge plans. Information verified: address, contacts, phone number, , insurance Yes  Insurance Provider: [employer]    Emergency Contact/Next of Kin name & number: spouse Vanessa  Who are involved in patient's support system? Vanessa    PCP: Veronica Fong DO  Date of last visit: 4 months      Discharge Planning    Living Arrangements:    lives with spouse    Home has 1 stories  2 stairs to climb to get into front door, no stairs to climb to reach second floor  Location of bedroom/bathroom in home main    Patient able to perform ADL's:Independent    Current Services (outpatient & in home) DME  DME equipment: none  DME provider: none    Is patient receiving oral anticoagulation therapy? Yes    If indicated: takes 4 baby asa Qd  Physician managing anticoagulation treatment: Yahaira  Where does patient obtain lab work for ATC treatment? Potential Assistance Needed:       Patient agreeable to home care: Yes  Freedom of choice provided:  TBD    Prior SNF/Rehab Placement and Facility: none  Agreeable to SNF/Rehab: Yes  [place] of choice provided: will need choice     Evaluation: no    Expected Discharge date:       Patient expects to be discharged to:   probable SNF vs HC    If home: is the family and/or caregiver wiling & able to provide support at home? yes  Who will be providing this support? spouse*    Follow Up Appointment: Best Day/ Time:      Transportation provider: CHARLY  Transportation arrangements needed for discharge: Yes    Readmission Risk              Risk of Unplanned Readmission:  8             Does patient have a readmission risk score greater than 14?: No  If yes, follow-up appointment must be made within 7 days of discharge.      Goals of Care: comfort      Educated pt on transitional options, provided freedom of choice and are agreeable with plan      Discharge Plan: Pt on high flow 60L will need LTACH choice          Electronically signed by Albino Jean Baptiste RN on 11/3/21 at 5:06 PM EDT

## 2021-11-03 NOTE — ED NOTES
Bed: 24  Expected date:   Expected time:   Means of arrival:   Comments:     Jillian Hammans, RN  11/03/21 2527

## 2021-11-03 NOTE — ED PROVIDER NOTES
[address] HANDOFF       Handoff taken on the following patient from prior Attending Physician:  Pt Name: Radha Church  PCP:  Mary Alegre,     Attestation  I was available and discussed any additional care issues that arose and coordinated the management plans with the resident(s) caring for the patient during my duty period. Any areas of disagreement with resident's documentation of care or procedures are noted on the chart. I was personally present for the key portions of any/all procedures during my duty period. I have documented in the chart those procedures where I was not present during the key portions. CHIEF COMPLAINT       Chief Complaint   Patient presents with    Positive For Covid-19     went to infusion center for covid +, stumbling in with wife, RN checked his oxygen and it was 62s, brought over to ed via wheelchair on 4L via NC, increased to 6L with no change and oxygen in 70s, now on NRB. CURRENT MEDICATIONS     Previous Medications  There are no discharge medications for this patient. Encounter Medications  Orders Placed This Encounter   Medications    dexamethasone (DECADRON) injection 10 mg       ALLERGIES     has No Known Allergies. RECENT VITALS:   Temp: 100.4 °F (38 °C),  Pulse: 87, Resp: 24, BP: 113/82    RADIOLOGY:   XR CHEST (SINGLE VIEW FRONTAL)   Final Result   Scattered pulmonary opacities consistent with multifocal airspace disease.              LABS:  Labs Reviewed   CBC WITH AUTO DIFFERENTIAL - Abnormal; Notable for the following components:       Result Value    Platelets 75 (*)     Seg Neutrophils 73 (*)     Lymphocytes 23 (*)     Eosinophils % 0 (*)     All other components within normal limits   COMPREHENSIVE METABOLIC PANEL - Abnormal; Notable for the following components:    Glucose 107 (*)     Calcium 8.1 (*)     Sodium 127 (*)     Potassium 3.5 (*)     Chloride 87 (*)     ALT 91 (*)      (*)     Albumin 3.3 (*)     All other components within normal limits   FIBRINOGEN - Abnormal; Notable for the following components:    Fibrinogen 538 (*)     All other components within normal limits   PROCALCITONIN - Abnormal; Notable for the following components:    Procalcitonin 1.43 (*)     All other components within normal limits   C-REACTIVE PROTEIN - Abnormal; Notable for the following components:    CRP 86.2 (*)     All other components within normal limits   LACTATE DEHYDROGENASE - Abnormal; Notable for the following components:     (*)     All other components within normal limits   FERRITIN - Abnormal; Notable for the following components:    Ferritin 2,936 (*)     All other components within normal limits   LACTIC ACID, PLASMA - Abnormal; Notable for the following components:    Lactic Acid, Whole Blood 2.9 (*)     All other components within normal limits   PROTIME-INR   D-DIMER, QUANTITATIVE   TROPONIN   VITAMIN D 25 HYDROXY     Known Covid, significant dyspnea, hypoxemia at rest down to the 60 to 70%. Now currently on BiPAP and saturating better. PLAN/ TASKS OUTSTANDING     Labs, imaging, steroids, admission, awaiting bed placement      (Please note that portions of this note were completed with a voice recognition program.  Efforts were made to edit the dictations but occasionally words are mis-transcribed. )    Everardo Carson MD,, MD, F.A.C.E.P.   Attending Emergency Physician        Everardo Carson MD  11/03/21 7601

## 2021-11-03 NOTE — ED PROVIDER NOTES
[facility] ED  Emergency Department  Emergency Medicine Resident Sign-out     Care of Seble Rasmussen was assumed from Dr. Renata Smith and is being seen for Positive For Covid-19 (went to infusion center for covid +, stumbling in with wife, RN checked his oxygen and it was 62s, brought over to ed via wheelchair on 4L via NC, increased to 6L with no change and oxygen in 70s, now on NRB. )  . The patient's initial evaluation and plan have been discussed with the prior provider who initially evaluated the patient.      EMERGENCY DEPARTMENT COURSE / MEDICAL DECISION MAKING:       MEDICATIONS GIVEN:  Orders Placed This Encounter   Medications    dexamethasone (DECADRON) injection 10 mg       LABS / RADIOLOGY:     Labs Reviewed   CBC WITH AUTO DIFFERENTIAL - Abnormal; Notable for the following components:       Result Value    Platelets 75 (*)     Seg Neutrophils 73 (*)     Lymphocytes 23 (*)     Eosinophils % 0 (*)     All other components within normal limits   COMPREHENSIVE METABOLIC PANEL - Abnormal; Notable for the following components:    Glucose 107 (*)     Calcium 8.1 (*)     Sodium 127 (*)     Potassium 3.5 (*)     Chloride 87 (*)     ALT 91 (*)      (*)     Albumin 3.3 (*)     All other components within normal limits   FIBRINOGEN - Abnormal; Notable for the following components:    Fibrinogen 538 (*)     All other components within normal limits   PROCALCITONIN - Abnormal; Notable for the following components:    Procalcitonin 1.43 (*)     All other components within normal limits   C-REACTIVE PROTEIN - Abnormal; Notable for the following components:    CRP 86.2 (*)     All other components within normal limits   LACTATE DEHYDROGENASE - Abnormal; Notable for the following components:     (*)     All other components within normal limits   LACTIC ACID, PLASMA - Abnormal; Notable for the following components:    Lactic Acid, Whole Blood 2.9 (*)     All other components within normal limits   PROTIME-INR   D-DIMER, QUANTITATIVE   FERRITIN   TROPONIN   TROPONIN   VITAMIN D 25 HYDROXY       XR CHEST (SINGLE VIEW FRONTAL)    Result Date: 11/3/2021  EXAMINATION: ONE XRAY VIEW OF THE CHEST 11/3/2021 12:03 pm COMPARISON: None. HISTORY: ORDERING SYSTEM PROVIDED HISTORY: sob/covid TECHNOLOGIST PROVIDED HISTORY: sob/covid FINDINGS: AP portable view of the chest time stamped at 1157 hours demonstrates overlying cardiac monitoring electrodes. Heart size is normal.  Scattered diffusely distributed pulmonary opacities are present consistent with multifocal airspace disease. No effusion or extrapleural air is noted. Scattered pulmonary opacities consistent with multifocal airspace disease. RECENT VITALS:      ,  Pulse: 87, Resp: 13, BP: 113/82, SpO2: 93 %      This patient is a 60 y.o. Male with Covid positive and hypoxia being admitted to medicine for further management and care. Admit orders already placed up waiting room. OUTSTANDING TASKS / RECOMMENDATIONS:    1. Admitted, pending room placement. FINAL IMPRESSION:     1. COVID-19    2. Hypoxia    3. Hyponatremia        DISPOSITION:         DISPOSITION:  []  Discharge   []  Transfer -    [x]  Admission -     []  Against Medical Advice   []  Eloped   FOLLOW-UP: No follow-up provider specified.    DISCHARGE MEDICATIONS: New Prescriptions    No medications on file          Naty Putnam DO  Emergency Medicine Resident  [facility]     Naty Putnam [place]  Resident  11/03/21 0696

## 2021-11-03 NOTE — H&P
[facility]  Office: [address], DO, Ngozi Britt, DO, Shannan George, DO, [facility] Blood, DO, Jeff Castro MD, Nanda Varma MD, Aixa Nice MD, Marry Sharma MD, Jj Raygoza MD, Nataly Car MD, Laura King MD, Kelly Leahy MD, Albania Moulton, DO, Cori Garcia, DO, Branden Mcguire MD,  Enrrique Bartlett DO, Eloisa Roberson MD, Toñito Bunn MD, Scott Goodman MD, Sonam Jackman MD, Karolina Sylvester MD, Wendy Desai MD, Julio Allison [facility], [facility], CNP, Yecenia Greco, CNP, Flora Mojica, CNS, Jody Millan, CNP, Tammy Julian, CNP, Keren Aguilar, CNP, Rasta Nance, CNP, Silvana Yancey, CNP, Destiney Treadwell, CNP, Justine Aragon PA-C, Will Richardson, [facility], Sandra Francisco, CNP, Christoph Cesar, CNP, Naomi Keen, CNP, Melita Colla, CNP, Mariaa Fruits, CNP, Óscar [place], CNP, Mary Katz, CNP         [address]    HISTORY AND PHYSICAL EXAMINATION            Date:   11/3/2021  Patient name:  Abraham Barreto  Date of admission:  11/3/2021 10:59 AM  MRN:   [id number]  Account:  [de-identified]  YOB: 1965  PCP:    Demetrius Peña DO  Room:   [facility]-  Code Status:    Full Code    Chief Complaint:     Chief Complaint   Patient presents with    Positive For Covid-19     went to infusion center for covid +, stumbling in with wife, RN checked his oxygen and it was 62s, brought over to ed via wheelchair on 4L via NC, increased to 6L with no change and oxygen in 70s, now on NRB. History Obtained From:     patient, electronic medical record    History of Present Illness:     Abraham Barreto is a 60 y.o.  Non- / non  male who presents with Positive For Covid-19 (went to infusion center for covid +, stumbling in with wife, RN checked his oxygen and it was 60s, brought over to ed via wheelchair on 4L via NC, increased to 6L with no change and oxygen in 70s, now on NRB. )   and is admitted to the hospital for the management of Pneumonia due to COVID-19 virus. 68-year-old male past medical history of obesity, BPH, hypertension presents with worsening shortness of breath was hypoxic. Patient was originally planning to go to the infusion center for being Covid positive patient requiring 4 L nasal cannula worsening to 6 L and now on BiPAP. Past Medical History:     Past Medical History:   Diagnosis Date    Diabetes mellitus ([address]     Hyperlipidemia     Hypertension     Kidney stone     Thyroid disease         Past Surgical History:     Past Surgical History:   Procedure Laterality Date    HERNIA REPAIR      SHOULDER ARTHROPLASTY          Medications Prior to Admission:     Prior to Admission medications    Medication Sig Start Date End Date Taking?  Authorizing Provider   fluticasone-umeclidin-vilant (TRELEGY ELLIPTA) 100-62.5-25 MCG/INH AEPB Inhale 1 puff into the lungs daily 7/26/21  Yes Historical Provider, MD   allopurinol (ZYLOPRIM) 300 MG tablet Take 300 mg by mouth daily 8/31/21   Historical Provider, MD   cariprazine hcl (VRAYLAR) 1.5 MG capsule Take 1.5 mg by mouth daily    Historical Provider, MD   carvedilol (COREG) 6.25 MG tablet Take 6.25 mg by mouth 2 times daily 8/17/21   Historical Provider, MD   co-enzyme Q-10 30 MG capsule Take 30 mg by mouth 3 times daily    Historical Provider, MD   esomeprazole (NEXIUM) 20 MG delayed release capsule Take 20 mg by mouth every morning (before breakfast)    Historical Provider, MD   levocetirizine (XYZAL) 5 MG tablet Take 5 mg by mouth every evening    Historical Provider, MD   Levothyroxine Sodium 150 MCG CAPS Take 150 mcg by mouth daily    Historical Provider, MD   olmesartan-amLODIPine-HCTZ 40-10-25 MG TABS 1 tablet 10/27/21   Historical Provider, MD   Melatonin 10 MG TABS Take 10 mg by mouth nightly as needed    Historical Provider, MD   ondansetron (ZOFRAN-ODT) 4 MG disintegrating tablet Take 1 tablet by mouth every 6 hours as needed 11/1/21 Historical Provider, MD   potassium citrate (UROCIT-K) 10 MEQ (1080 MG) extended release tablet Take 10 mEq by mouth daily 10/5/21   Historical Provider, MD   rosuvastatin (CRESTOR) 40 MG tablet Take 40 mg by mouth nightly 10/28/21   Historical Provider, MD   tamsulosin (FLOMAX) 0.4 MG capsule Take 0.4 mg by mouth daily    Historical Provider, MD        Allergies: Other    Social History:     Tobacco:    reports that he has never smoked. He has never used smokeless tobacco.  Alcohol:      reports current alcohol use. Drug Use:  has no history on file for drug use. Family History:     History reviewed. No pertinent family history. Review of Systems:     Positive and Negative as described in HPI.     CONSTITUTIONAL:  negative for fevers, chills, sweats, fatigue, weight loss  HEENT:  negative for vision, hearing changes, runny nose, throat pain  RESPIRATORY:  negative for shortness of breath, cough, congestion, wheezing  CARDIOVASCULAR:  negative for chest pain, palpitations  GASTROINTESTINAL:  negative for nausea, vomiting, diarrhea, constipation, change in bowel habits, abdominal pain   GENITOURINARY:  negative for difficulty of urination, burning with urination, frequency   INTEGUMENT:  negative for rash, skin lesions, easy bruising   HEMATOLOGIC/LYMPHATIC:  negative for swelling/edema   ALLERGIC/IMMUNOLOGIC:  negative for urticaria , itching  ENDOCRINE:  negative increase in drinking, increase in urination, hot or cold intolerance  MUSCULOSKELETAL:  negative joint pains, muscle aches, swelling of joints  NEUROLOGICAL:  negative for headaches, dizziness, lightheadedness, numbness, pain, tingling extremities  BEHAVIOR/PSYCH:  negative for depression, anxiety    Physical Exam:   /82   Pulse 87   Temp 100.9 °F (38.3 °C) (Axillary)   Resp 24   Ht 6' (1.829 m)   Wt 294 lb 3.2 oz (133.4 kg)   SpO2 92%   BMI 39.90 kg/m²   Temp (24hrs), Av.7 °F (38.2 °C), Min:100.4 °F (38 °C), Max:100.9 °F (38.3 °C)    No results for input(s): POCGLU in the last 72 hours. No intake or output data in the 24 hours ending 11/03/21 1725    Physical Exam  Constitutional:       General: He is in acute distress. Appearance: He is obese. He is ill-appearing. HENT:      Head: Normocephalic and atraumatic. Nose: Nose normal.      Mouth/Throat:      Mouth: Mucous membranes are moist.   Eyes:      Extraocular Movements: Extraocular movements intact. Pupils: Pupils are equal, round, and reactive to light. Cardiovascular:      Rate and Rhythm: Normal rate and regular rhythm. Heart sounds: No murmur heard. Pulmonary:      Effort: Tachypnea, accessory muscle usage and prolonged expiration present. Breath sounds: Examination of the right-upper field reveals decreased breath sounds. Examination of the left-upper field reveals decreased breath sounds. Examination of the right-middle field reveals decreased breath sounds. Examination of the right-lower field reveals decreased breath sounds. Examination of the left-lower field reveals decreased breath sounds. Decreased breath sounds present. No wheezing or rhonchi. Musculoskeletal:      Cervical back: Neck supple. Neurological:      Mental Status: He is alert. Investigations:      Laboratory Testing:  Recent Results (from the past 24 hour(s))   CBC WITH AUTO DIFFERENTIAL    Collection Time: 11/03/21 11:36 AM   Result Value Ref Range    WBC 5.2 3.5 - 11.3 k/uL    RBC 5.20 4. 21 - 5.77 m/uL    Hemoglobin 15.6 13.0 - 17.0 g/dL    Hematocrit 46.7 40.7 - 50.3 %    MCV 89.8 82.6 - 102.9 fL    MCH 30.0 25.2 - 33.5 pg    MCHC 33.4 28.4 - 34.8 g/dL    RDW 13.6 11.8 - 14.4 %    Platelets 75 (L) 339 - 453 k/uL    MPV 12.3 8.1 - 13.5 fL    NRBC Automated 0.0 0.0 per 100 WBC    Differential Type NOT REPORTED     WBC Morphology NOT REPORTED     RBC Morphology NOT REPORTED     Platelet Estimate NOT REPORTED     Immature Granulocytes 0 0 %    Seg Neutrophils 73 (H) 36 - 65 %    Lymphocytes 23 (L) 24 - 43 %    Monocytes 4 3 - 12 %    Eosinophils % 0 (L) 1 - 4 %    Basophils 0 0 - 2 %    Absolute Immature Granulocyte 0.00 0.00 - 0.30 k/uL    Segs Absolute 3.79 1.50 - 8.10 k/uL    Absolute Lymph # 1.20 1.10 - 3.70 k/uL    Absolute Mono # 0.21 0.10 - 1.20 k/uL    Absolute Eos # 0.00 0.00 - 0.44 k/uL    Basophils Absolute 0.00 0.00 - 0.20 k/uL    Morphology Normal    COMPREHENSIVE METABOLIC PANEL    Collection Time: 11/03/21 11:36 AM   Result Value Ref Range    Glucose 107 (H) 70 - 99 mg/dL    BUN 14 6 - 20 mg/dL    CREATININE 1.16 0.70 - 1.20 mg/dL    Bun/Cre Ratio NOT REPORTED 9 - 20    Calcium 8.1 (L) 8.6 - 10.4 mg/dL    Sodium 127 (L) 135 - 144 mmol/L    Potassium 3.5 (L) 3.7 - 5.3 mmol/L    Chloride 87 (L) 98 - 107 mmol/L    CO2 25 20 - 31 mmol/L    Anion Gap 15 9 - 17 mmol/L    Alkaline Phosphatase 52 40 - 129 U/L    ALT 91 (H) 5 - 41 U/L     (H) <40 U/L    Total Bilirubin 0.44 0.3 - 1.2 mg/dL    Total Protein 6.6 6.4 - 8.3 g/dL    Albumin 3.3 (L) 3.5 - 5.2 g/dL    Albumin/Globulin Ratio 1.0 1.0 - 2.5    GFR Non-African American >60 >60 mL/min    GFR African American >60 >60 mL/min    GFR Comment          GFR Staging NOT REPORTED    Protime-INR    Collection Time: 11/03/21 11:36 AM   Result Value Ref Range    Protime 10.2 9.1 - 12.3 sec    INR 0.9    FIBRINOGEN    Collection Time: 11/03/21 11:36 AM   Result Value Ref Range    Fibrinogen 538 (H) 140 - 420 mg/dL   Procalcitonin    Collection Time: 11/03/21 11:36 AM   Result Value Ref Range    Procalcitonin 1.43 (H) <0.09 ng/mL   C-REACTIVE PROTEIN    Collection Time: 11/03/21 11:36 AM   Result Value Ref Range    CRP 86.2 (H) 0.0 - 5.0 mg/L   Lactate Dehydrogenase    Collection Time: 11/03/21 11:36 AM   Result Value Ref Range     (H) 135 - 225 U/L   FERRITIN    Collection Time: 11/03/21 11:36 AM   Result Value Ref Range    Ferritin 2,936 (H) 30 - 400 ug/L   D-DIMER, QUANTITATIVE    Collection Time: 11/03/21 11:36 AM   Result Value Ref Range    D-Dimer, Quant 0.93 mg/L FEU   Lactic Acid, Plasma    Collection Time: 11/03/21 11:36 AM   Result Value Ref Range    Lactic Acid NOT REPORTED mmol/L    Lactic Acid, Whole Blood 2.9 (H) 0.7 - 2.1 mmol/L   Troponin    Collection Time: 11/03/21  2:34 PM   Result Value Ref Range    Troponin, High Sensitivity <6 0 - 22 ng/L    Troponin T NOT REPORTED <0.03 ng/mL    Troponin Interp NOT REPORTED        Imaging/Diagnostics:  XR CHEST (SINGLE VIEW FRONTAL)    Result Date: 11/3/2021  Scattered pulmonary opacities consistent with multifocal airspace disease. Assessment :      Hospital Problems         Last Modified POA    * (Principal) Pneumonia due to COVID-19 virus 11/3/2021 Yes          Plan:     Patient status inpatient in the Progressive Unit/Step down    1. Acute respiratory failure secondary to COVID-19. Give another dose of Decadron, change to high-dose Decadron afterwards. Did discuss with patient risks and benefits of Actemra patient is agreeable. High flow if tolerating. 2. Continue BiPAP requiring 70% FiO2  3. Hypothyroidism continue Synthroid  4. GERD continue Protonix  5. Hypertension Coreg, monitor blood pressure  6. We will give 1 dose IV Lasix to help with respiratory distress  7. Appreciate infectious disease pulmonary recommendations. BPH. Flomax  8. PT/OT when tolerating  9. Discussed with patient about code status, wants to be a full code. Consultations:   IP CONSULT TO HOSPITALIST  IP CONSULT TO INFECTIOUS DISEASES  IP CONSULT TO INFECTIOUS DISEASES  IP CONSULT TO PULMONOLOGY  PHARMACY TO DOSE MEDICATION    Patient is admitted as inpatient status because of co-morbidities listed above, severity of signs and symptoms as outlined, requirement for current medical therapies and most importantly because of direct risk to patient if care not provided in a hospital setting. Expected length of stay > 48 hours.     Gurmeet Florian MD  11/3/2021  5:25 PM    Copy sent to Dr. Damaris Ga DO

## 2021-11-03 NOTE — ED NOTES
Spoke to patients wife, Melissa Payne, okay to speak with and update on phone per patient.       Donna Carter RN  11/03/21 3573

## 2021-11-03 NOTE — ED NOTES
Pt to ed from home with wife, went to infusion center for covid +, stumbling in with wife, RN checked his oxygen and it was 60s, brought over to ed via wheelchair on 4L via NC, increased to 6L with no change and oxygen in 70s, now on NRB. Shiva RT at bedside to place patient on bipap. Patient is aox4, reports intermittently having different symptoms, covid symptoms began Thursday, positive result Monday, reports intermittently short of breath with lightheaded. Patient has decreased taste and smell. Patent is aox4, answering questions appropriately. Patient arrived dusky, now pink and appropriate with oxygen on.       Deja Guajardo RN  11/03/21 1119

## 2021-11-03 NOTE — ED PROVIDER NOTES
[address] [facility]  eMERGENCY dEPARTMENT eNCOUnter      Pt Name: Jaylin Delgado  MRN: [id number]  [place] 1965  Date of evaluation: 11/3/21    CHIEF COMPLAINT       Chief Complaint   Patient presents with    Positive For Covid-19     went to infusion center for covid +, stumbling in with wife, RN checked his oxygen and it was 62s, brought over to ed via wheelchair on 4L via NC, increased to 6L with no change and oxygen in 70s, now on NRB. HISTORY OF PRESENT ILLNESS   Jaylin Delgado is a 60 y.o. male who presents patient seen in Covid monoclonal antibody infusion center today for Covid. Patient was stumbling and and had oxygen saturations in the 60s. Patient improved to the 70s with nasal cannula. Patient brought here to be evaluated and treated. Patient symptoms are severe and constant. Patient is short of breath. Patient has no chest pain. Patient has no abdominal pain. REVIEW OF SYSTEMS       Review of Systems   Constitutional: Negative for chills and fever. HENT: Negative for ear pain and tinnitus. Eyes: Negative for pain, discharge and visual disturbance. Respiratory: Positive for cough and shortness of breath. Negative for apnea. Cardiovascular: Negative for chest pain. Gastrointestinal: Negative for blood in stool, diarrhea, nausea and vomiting. Diarrhea has abated   Genitourinary: Negative for dysuria, frequency and hematuria. Musculoskeletal: Negative for arthralgias and myalgias. Skin: Negative for rash and wound. Neurological: Positive for weakness. Negative for syncope, numbness and headaches. Psychiatric/Behavioral: Negative for agitation. PAST MEDICAL HISTORY    has a past medical history of Diabetes mellitus ([address] Hyperlipidemia, Hypertension, Kidney stone, and Thyroid disease. SURGICAL HISTORY      has a past surgical history that includes Total shoulder arthroplasty and hernia repair.     CURRENT MEDICATIONS Content: Thought content normal.         Judgment: Judgment normal.       Patient able to speak in full sentences. Patient is no acute distress while sitting. Patient is satting in the 76s. DIFFERENTIAL DIAGNOSIS/MDM:   Patient with low oxygen saturation. Patient will require BiPAP. Patient will require admission for oxygen requirements and hypoxia with generalized weakness and fatigue as well as being a fall risk. Plan for call to admitting service for admission and labs in the meantime. Will ensure the patient has an appropriate D-dimer and does not require CT for PE. Will give Decadron. DIAGNOSTIC RESULTS     EKG: All EKG's are interpreted by the Emergency Department Physician who either signs or Co-signs this chart in the absenceof a cardiologist.    EKG shows sinus rhythm with occasional PVC and PAC. Left axis deviation. No ST elevation or depression. No T wave inversion, Q wave lead III aVF. No blocks, nonspecific EKG. RADIOLOGY:   I directly visualized the following  images and reviewed theradiologist interpretations:  XR CHEST (SINGLE VIEW FRONTAL)    Result Date: 11/3/2021  EXAMINATION: ONE XRAY VIEW OF THE CHEST 11/3/2021 12:03 pm COMPARISON: None. HISTORY: ORDERING SYSTEM PROVIDED HISTORY: sob/covid TECHNOLOGIST PROVIDED HISTORY: sob/covid FINDINGS: AP portable view of the chest time stamped at 1157 hours demonstrates overlying cardiac monitoring electrodes. Heart size is normal.  Scattered diffusely distributed pulmonary opacities are present consistent with multifocal airspace disease. No effusion or extrapleural air is noted. Scattered pulmonary opacities consistent with multifocal airspace disease.          ED BEDSIDE ULTRASOUND:   none    LABS:  Labs Reviewed   CBC WITH AUTO DIFFERENTIAL - Abnormal; Notable for the following components:       Result Value    Platelets 75 (*)     Seg Neutrophils 73 (*)     Lymphocytes 23 (*)     Eosinophils % 0 (*)     All other components within normal limits   COMPREHENSIVE METABOLIC PANEL - Abnormal; Notable for the following components:    Glucose 107 (*)     Calcium 8.1 (*)     Sodium 127 (*)     Potassium 3.5 (*)     Chloride 87 (*)     ALT 91 (*)      (*)     Albumin 3.3 (*)     All other components within normal limits   FIBRINOGEN - Abnormal; Notable for the following components:    Fibrinogen 538 (*)     All other components within normal limits   PROCALCITONIN - Abnormal; Notable for the following components:    Procalcitonin 1.43 (*)     All other components within normal limits   C-REACTIVE PROTEIN - Abnormal; Notable for the following components:    CRP 86.2 (*)     All other components within normal limits   LACTATE DEHYDROGENASE - Abnormal; Notable for the following components:     (*)     All other components within normal limits   LACTIC ACID, PLASMA - Abnormal; Notable for the following components:    Lactic Acid, Whole Blood 2.9 (*)     All other components within normal limits   PROTIME-INR   D-DIMER, QUANTITATIVE   FERRITIN   TROPONIN   TROPONIN   VITAMIN D 25 HYDROXY       As above, reviewed. EMERGENCY DEPARTMENT COURSE:   Vitals:    Vitals:    11/03/21 1104 11/03/21 1117 11/03/21 1216   BP: 129/81  113/77   Pulse: 82  84   Resp: 30 12 14   SpO2: (!) 84% 91% (!) 88%   Weight: 280 lb (127 kg)     Height: 6' (1.829 m)       As above, reviewed. Much improved on bipap. CRITICAL CARE:  30 min. Requiring Bipap with low sat. CONSULTS:  [facility] - admitted. PROCEDURES:  none    FINAL IMPRESSION      1. COVID-19    2. Hypoxia    3. Hyponatremia          DISPOSITION/PLAN   Admitted      PATIENT REFERRED TO:  No follow-up provider specified.     DISCHARGE MEDICATIONS:     New Prescriptions    No medications on file       (Please note that portions of this note were completed with a voice recognition program.  Efforts were made to edit thedictations but occasionally words are mis-transcribed.)    Eloisa Merida Renata Smith MD  Emergency Medicine              Keny Sullivan MD  11/03/21 0404

## 2021-11-03 NOTE — ED NOTES
Patient resting on cot, on monitor, updated on plan of care, denies needs. NAD noted.       Ravinder Woodward, RN  11/03/21 6985

## 2021-11-04 PROBLEM — G47.33 OBSTRUCTIVE SLEEP APNEA SYNDROME: Status: ACTIVE | Noted: 2021-01-01

## 2021-11-04 PROBLEM — E03.9 ACQUIRED HYPOTHYROIDISM: Status: ACTIVE | Noted: 2021-01-01

## 2021-11-04 NOTE — PLAN OF CARE
Problem: Airway Clearance - Ineffective  Goal: Achieve or maintain patent airway  Outcome: Ongoing     Problem: Gas Exchange - Impaired  Goal: Absence of hypoxia  Outcome: Ongoing  Goal: Promote optimal lung function  Outcome: Ongoing     Problem: Breathing Pattern - Ineffective  Goal: Ability to achieve and maintain a regular respiratory rate  Outcome: Ongoing     Problem:  Body Temperature -  Risk of, Imbalanced  Goal: Ability to maintain a body temperature within defined limits  Outcome: Ongoing  Goal: Will regain or maintain usual level of consciousness  Outcome: Ongoing  Goal: Complications related to the disease process, condition or treatment will be avoided or minimized  Outcome: Ongoing     Problem: Isolation Precautions - Risk of Spread of Infection  Goal: Prevent transmission of infection  Outcome: Ongoing     Problem: Nutrition Deficits  Goal: Optimize nutritional status  Outcome: Ongoing     Problem: Risk for Fluid Volume Deficit  Goal: Maintain normal heart rhythm  Outcome: Ongoing  Goal: Maintain absence of muscle cramping  Outcome: Ongoing  Goal: Maintain normal serum potassium, sodium, calcium, phosphorus, and pH  Outcome: Ongoing     Problem: Loneliness or Risk for Loneliness  Goal: Demonstrate positive use of time alone when socialization is not possible  Outcome: Ongoing     Problem: Fatigue  Goal: Verbalize increase energy and improved vitality  Outcome: Ongoing     Problem: Patient Education: Go to Patient Education Activity  Goal: Patient/Family Education  Outcome: Ongoing     Problem: SAFETY  Goal: Free from accidental physical injury  Outcome: Ongoing  Goal: Free from intentional harm  Outcome: Ongoing     Problem: DAILY CARE  Goal: Daily care needs are met  Outcome: Ongoing     Problem: PAIN  Goal: Patient's pain/discomfort is manageable  Outcome: Ongoing     Problem: SKIN INTEGRITY  Goal: Skin integrity is maintained or improved  Outcome: Ongoing     Problem: KNOWLEDGE DEFICIT  Goal: Patient/S.O. demonstrates understanding of disease process, treatment plan, medications, and discharge instructions.   Outcome: Ongoing     Problem: DISCHARGE BARRIERS  Goal: Patient's continuum of care needs are met  Outcome: Ongoing

## 2021-11-04 NOTE — PLAN OF CARE
Problem: Airway Clearance - Ineffective  Goal: Achieve or maintain patent airway  2021 1315 by Andrea Kendall RCP  Outcome: Ongoing  2021 05 by Jenny Zhong RN  Outcome: Ongoing     Problem: Gas Exchange - Impaired  Goal: Absence of hypoxia  2021 1438 by Serge Richardson RN  Outcome: Ongoing  2021 1315 by Andrea Kendall RCP  Outcome: Ongoing  2021 05 by Jenny Zhong RN  Outcome: Ongoing  Goal: Promote optimal lung function  2021 1438 by Serge Richardosn RN  Outcome: Ongoing  2021 1315 by Andrea Kendall RCP  Outcome: Ongoing  2021 05 by Jenny Zhong RN  Outcome: Ongoing     Problem: Breathing Pattern - Ineffective  Goal: Ability to achieve and maintain a regular respiratory rate  2021 by Serge Richardson RN  Outcome: Ongoing  2021 131 by Andrea Kendall RCP  Outcome: Ongoing  2021 by Jenny Zhong RN  Outcome: Ongoing     Problem:  Body Temperature -  Risk of, Imbalanced  Goal: Ability to maintain a body temperature within defined limits  2021 by Jenny Zhong RN  Outcome: Ongoing  Goal: Will regain or maintain usual level of consciousness  2021 by Jenny Zhong RN  Outcome: Ongoing  Goal: Complications related to the disease process, condition or treatment will be avoided or minimized  2021 by Jenny Zhong RN  Outcome: Ongoing     Problem: Isolation Precautions - Risk of Spread of Infection  Goal: Prevent transmission of infection  2021 by Jenny Zhong RN  Outcome: Ongoing     Problem: Nutrition Deficits  Goal: Optimize nutritional status  2021 by Jenny Zhong RN  Outcome: Ongoing     Problem: Risk for Fluid Volume Deficit  Goal: Maintain normal heart rhythm  2021 by Jenny Zhong RN  Outcome: Ongoing  Goal: Maintain absence of muscle cramping  2021 by Jenny Zhong RN  Outcome: Ongoing  Goal: Maintain normal serum potassium, sodium, calcium, phosphorus, and pH  11/4/2021 0537 by Matthew Piper RN  Outcome: Ongoing     Problem: Loneliness or Risk for Loneliness  Goal: Demonstrate positive use of time alone when socialization is not possible  11/4/2021 0537 by Matthew Piper RN  Outcome: Ongoing     Problem: Fatigue  Goal: Verbalize increase energy and improved vitality  11/4/2021 0537 by Matthew Piper RN  Outcome: Ongoing     Problem: Patient Education: Go to Patient Education Activity  Goal: Patient/Family Education  11/4/2021 0537 by Matthew Piper RN  Outcome: Ongoing     Problem: SAFETY  Goal: Free from accidental physical injury  11/4/2021 0537 by Matthew Piper RN  Outcome: Ongoing  Goal: Free from intentional harm  11/4/2021 0537 by Matthew Piper RN  Outcome: Ongoing     Problem: DAILY CARE  Goal: Daily care needs are met  11/4/2021 0537 by Matthew Piper RN  Outcome: Ongoing     Problem: PAIN  Goal: Patient's pain/discomfort is manageable  11/4/2021 0537 by Matthew Piper RN  Outcome: Ongoing     Problem: SKIN INTEGRITY  Goal: Skin integrity is maintained or improved  11/4/2021 0537 by Matthew Piper RN  Outcome: Ongoing     Problem: KNOWLEDGE DEFICIT  Goal: Patient/S.O. demonstrates understanding of disease process, treatment plan, medications, and discharge instructions.   11/4/2021 1438 by Garrick Anaya RN  Outcome: Ongoing  11/4/2021 0537 by Matthew Piper RN  Outcome: Ongoing     Problem: DISCHARGE BARRIERS  Goal: Patient's continuum of care needs are met  11/4/2021 0537 by Matthew Piper RN  Outcome: Ongoing     Problem: OXYGENATION/RESPIRATORY FUNCTION  Goal: Patient will maintain patent airway  11/4/2021 1316 by Tg Jordan RCP  Outcome: Ongoing  Goal: Patient will achieve/maintain normal respiratory rate/effort  Description: Respiratory rate and effort will be within normal limits for the patient  11/4/2021 1316 by Tg Jordan RCP  Outcome: Ongoing

## 2021-11-04 NOTE — PROGRESS NOTES
[facility]  Office: [address], DO, Javierryder Qureshi, DO, Gilmer Nguyễn, DO, Dheeraj Eleanorherson Trevizo, DO, Amanda Chaidez MD, Radha Gonzales MD, Fransisco Flynn MD, Serge Renee MD, Keith Marin MD, Compa Geiger MD, Marty Kumar MD, Jo-Ann Long MD, Jose Fields, DO, Moustapha Ann, DO, Eduardo Mccoy MD,  Osiris Staff, DO, Luis Armstrong MD, Fabrizio Armijo MD, Victoria Rousseau MD, Marty Tolbert MD, Sandi Flynn MD, Zhane Braun MD, Courtney Vazquez, CNP, [facility] Center[facility]UGENIO StrongGrosso, CNP, Bethanie Cancel, CNP, Brooke Fret, CNS, Stephanie Zita, CNP, Helen Bustamantery, CNP, Nita Brain, CNP, Yassine Garcia, CNP, Tawnya Tinsley, CNP, Luiz Solares, CNP, Orin Lazaro PA-C, Jenae Santiago, DNP, Philly Henriquez, CNP, Yandy Kelly, CNP, Kaylynn Gunter, CNP, Melida Garcia, CNP, David De La Torre, CNP, Vlad Clay, CNP, Sapphire Kemp, [address] [place] [address]    Progress Note    11/4/2021    12:26 PM    Name:   Danie Roman  MRN:     [id number]     Acct:      [de-identified]   Room:   [address] [place] [address] Day:  1  Admit Date:  11/3/2021 10:59 AM    PCP:   Alannah Jean DO  Code Status:  Full Code    Subjective:     C/C:   Chief Complaint   Patient presents with    Positive For Covid-19     went to infusion center for covid +, stumbling in with wife, RN checked his oxygen and it was 62s, brought over to ed via wheelchair on 4L via NC, increased to 6L with no change and oxygen in 70s, now on NRB. Interval History Status: not changed. pateitn seen and examined. On high flow, lft tredning up, weaning down on high flow. Brief History:     Danie Roman is a 60 y.o.  Non- / non  male who presents with Positive For Covid-19 (went to infusion center for covid +, stumbling in with wife, RN checked his oxygen and it was 60s, brought over to ed via wheelchair on 4L via NC, increased to 6L with no change and oxygen in 70s, now on NRB. )   and is admitted to the hospital for the management of Pneumonia due to COVID-19 virus.     75-year-old male past medical history of obesity, BPH, hypertension presents with worsening shortness of breath was hypoxic. Patient was originally planning to go to the infusion center for being Covid positive patient requiring 4 L nasal cannula worsening to 6 L and now on BiPAP. Actemra was given 11/3/2021. Review of Systems:     Constitutional:  negative for chills, fevers, sweats  Respiratory:  negative for cough, dyspnea on exertion, shortness of breath, wheezing  Cardiovascular:  negative for chest pain, chest pressure/discomfort, lower extremity edema, palpitations  Gastrointestinal:  negative for abdominal pain, constipation, diarrhea, nausea, vomiting  Neurological:  negative for dizziness, headache    Medications: Allergies: Allergies   Allergen Reactions    Other      Hayfever       Current Meds:   Scheduled Meds:    potassium chloride  40 mEq Oral BID WC    sodium chloride flush  5-40 mL IntraVENous 2 times per day    enoxaparin  30 mg SubCUTAneous BID    [START ON 11/5/2021] dexamethasone  20 mg IntraVENous Q24H    dexamethasone  10 mg IntraVENous Once    carvedilol  6.25 mg Oral BID    pantoprazole  40 mg Oral QAM AC    levothyroxine  150 mcg Oral Daily    rosuvastatin  40 mg Oral Nightly    tamsulosin  0.4 mg Oral Daily     Continuous Infusions:    sodium chloride       PRN Meds: sodium chloride flush, sodium chloride, ondansetron **OR** ondansetron, polyethylene glycol, acetaminophen **OR** acetaminophen    Data:     Past Medical History:   has a past medical history of Diabetes mellitus ([address] Hyperlipidemia, Hypertension, Kidney stone, and Thyroid disease. Social History:   reports that he has never smoked. He has never used smokeless tobacco. He reports current alcohol use. Family History: History reviewed. No pertinent family history.     Vitals:  /80   Pulse 70   Temp 98.6 °F (37 °C) (Axillary)   Resp 16   Ht 6' (1.829 m)   Wt 294 lb 3.2 oz (133.4 kg)   SpO2 92%   BMI 39.90 kg/m²   Temp (24hrs), Av.2 °F (37.3 °C), Min:97.9 °F (36.6 °C), Max:100.9 °F (38.3 °C)    No results for input(s): POCGLU in the last 72 hours. I/O (24Hr): Intake/Output Summary (Last 24 hours) at 2021 1226  Last data filed at 2021 1207  Gross per 24 hour   Intake    Output 1625 ml   Net -1625 ml       Labs:  Hematology:  Recent Labs     21  1136 21  0540   WBC 5.2 2.7*   RBC 5.20 5.21   HGB 15.6 15.8   HCT 46.7 46.8   MCV 89.8 89.8   MCH 30.0 30.3   MCHC 33.4 33.8   RDW 13.6 13.5   PLT 75* 94*   MPV 12.3 12.9   CRP 86.2*  --    INR 0.9  --    DDIMER 0.93  --      Chemistry:  Recent Labs     21  1136 21  1434 21  0540   *  --  126*   K 3.5*  --  3.5*   CL 87*  --  88*   CO2 25  --  23   GLUCOSE 107*  --  132*   BUN 14  --  16   CREATININE 1.16  --  0.85   MG  --   --  2.4   ANIONGAP 15  --  15   LABGLOM >60  --  >60   GFRAA >60  --  >60   CALCIUM 8.1*  --  8.2*   TROPHS  --  <6  --    LACTACIDWB 2.9*  --   --      Recent Labs     21  1136 21  0540   PROT 6.6 6.7   LABALBU 3.3* 3.2*   * 222*   ALT 91* 121*   *  --    ALKPHOS 52 62   BILITOT 0.44 0.48     ABG:  Lab Results   Component Value Date    POCPH 7.503 2021    POCPCO2 32.0 2021    POCPO2 50.9 2021    POCHCO3 25.2 2021    NBEA NOT REPORTED 2021    PBEA 3 2021    JPM1TNS NOT REPORTED 2021    JJSY8CAP 89 2021    FIO2 70.0 2021     No results found for: SPECIAL  No results found for: CULTURE    Radiology:  XR CHEST (SINGLE VIEW FRONTAL)    Result Date: 11/3/2021  Scattered pulmonary opacities consistent with multifocal airspace disease.      XR CHEST PORTABLE    Result Date: 2021  Increased bilateral airspace opacities suggesting worsening COVID pneumonia     XR CHEST PORTABLE    Result Date: 11/3/2021  No cardiomegaly or interstitial edema. Patchy bilateral perihilar and left lower lobe pneumonia like infiltrate was noted. Partial clearing has occurred bilaterally since 11/03/2021, 1157 hours. No pneumothorax. Physical Examination:        General appearance:  alert, cooperative and on high flow  Mental Status:  oriented to person, place and time and normal affect  Lungs:  cdistant breath sounds   Heart:  regular rate and rhythm, no murmur  Abdomen:  soft, nontender, nondistended, normal bowel sounds  Extremities:  no edema, redness, tenderness in the calves  Skin:  no gross lesions, rashes, induration    Assessment:        Hospital Problems         Last Modified POA    * (Principal) Acute respiratory failure due to COVID-19 ([address] 11/3/2021 Yes    Benign prostatic hyperplasia with lower urinary tract symptoms 11/3/2021 Yes    Obesity (BMI 35.0-39.9 without comorbidity) 11/3/2021 Yes          Plan:        1. Acute respiratory failure secondary to COVID-19.  high dose decadron Actemra 11/3/2021.  2. Hypothyroidism continue Synthroid  3. GERD continue Protonix  4. Hypertension Coreg, monitor blood pressure  5. Repeat lasix  6. Appreciate infectious disease pulmonary recommendations. BPH. Flomax  7.  PT/OT when tolerating    Juancho Wang MD  11/4/2021  12:26 PM

## 2021-11-04 NOTE — CONSULTS
[facility] - Initial Consult Note COVID 19 Patient  Today's Date and Time: 11/4/2021, 6:26 AM    Impression :   · COVID 19 Suspect  · COVID 19 Confirmed Infection  · Covid tests:  · 11/1/21: Positive  · Hyponatremia  · Elevated inflammatory markers  · Thrombocytopenia  · Transaminitis  · Essential HTN  · BPH  · Obesity  Recommendations:   · Antibiotic treatment:  · Monitor off antibiotics  · Covid Rx:  · Clinical Research will approach patient to explore if he qualifies for any of the COVID 19 treatment protocols. · Remdesivir-contraindicated because of transaminitis  · Decadron-initiated 11/3/21  · Actemra-Ordered per primary on 11/3/21    Medical Decision Making/Summary/Discussion:11/4/2021     · Patient admitted with suspected COVID 19 infection  · Patient states he tested positive on 11/1/21-Rapid test ordered 11/4/21  · Patient may come out of isolation on 11/22/21    Infection Control Recommendations   · Universal Precautions  · Airborne isolation  · Droplet Isolation    Antimicrobial Stewardship Recommendations     · Discontinuation of therapy  Coordination of Outpatient Care:   · Estimated Length of IV antimicrobials:TBD  · Patient will need Midline Catheter Insertion: TBD  · Patient will need PICC line Insertion: No  · Patient will need: Home IV , [place],  SNF,  LTAC:TBD  · Patient will need outpatient wound care:No    Chief complaint/reason for consultation:   · Concern for COVID infection      History of Present Illness:   Ariel Arizmendi is a 60y.o.-year-old male who was initially admitted on 11/3/2021. Patient seen at the request of Dr. Jonathan Bowen:    Patient, who stated he tested positive for Covid on 11/1/21, presented to the infusion center on 11/3/21 for the monoclonal antibody infusion. His symptoms started on 10/28/21. The RN noted his SpO2 was in the low 60s on room air. He was placed on nasal cannula and brought to the ED.     While in the ED, his oxygen did not improve with NC and he was put on non-re-breather and then BiPAP. His CRP was 86.2. The patient was started on Decadron and administered Actemra. CXR 11/3/21  Patchy bilateral perihilar and left lower lobe pneumonia like infiltrate      Patient admitted because of concerns with COVID 19.    CURRENT EVALUATION : 11/4/2021    Afebrile  VS stable    Patient exhibiting respiratory distress. yes  Respiratory secretions:     Patient receiving supplemental oxygen. Hi-flow NC  RR: 16  02 sat: 95    % FIO2: 90  Flow rate: 60 L/min  PEEP:      QTc:       NEWS Score: 0-4 Low risk group; 5-6: Medium risk group; 7 or above: High risk group  Parameters 3 2 1 0 1 2 3   Age    < 65   ? 65   RR ? 8  9-11 12-20  21-24 ? 25   O2 Sats ? 91 92-93 94-95 ? 96      Suppl O2  Yes  No      SBP ? 90  101-110 111-219   ? 220   HR ? 40  41-50 51-90  111-130 ? 131   Consciousness    Alert   Drowsiness, lethargy, or confusion   Temperature ? 35.0 C (95.0 F)  35.1-36.0 C 95.1-96.9 F 36.1-38.0 C 97.0-100.4 F 38.1-39.0 C 100.5-102.3 F ? 39.1 C ? 102.4 F      NEWS Score:   11/4/21: 3 Low risk    Overall Daily Picture:      Unchanged    Presence of secondary bacterial Infection:    No   Additional antibiotics: No    Labs, X rays reviewed: 11/4/2021    Na: 126  BUN:16  Cr:0.85    WBC:2.7  Hb:15.8  Plat: 94    Absolute Neutrophils:1.72  Absolute Lymphocytes:0.68  Neutrophil/Lymphocyte Ratio: 2.5 low risk    CRP:86.2  Ferritin:2936  LDH: 794    Pro Calcitonin:      Cultures:  Urine:  ·   Blood:  ·   Sputum :  ·   Wound:       CXR:   11/3/21    CAT:      Discussed with patient, RN, CC, IM. I have personally reviewed the past medical history, past surgical history, medications, social history, and family history, and I have updated the database accordingly.   Past Medical History:     Past Medical History:   Diagnosis Date    Diabetes mellitus ([facility] [address]     Hyperlipidemia     Hypertension     Kidney stone  Thyroid disease        Past Surgical  History:     Past Surgical History:   Procedure Laterality Date    HERNIA REPAIR      SHOULDER ARTHROPLASTY         Medications:      sodium chloride flush  5-40 mL IntraVENous 2 times per day    enoxaparin  30 mg SubCUTAneous BID    [START ON 11/5/2021] dexamethasone  20 mg IntraVENous Q24H    dexamethasone  10 mg IntraVENous Once    carvedilol  6.25 mg Oral BID    pantoprazole  40 mg Oral QAM AC    levothyroxine  150 mcg Oral Daily    rosuvastatin  40 mg Oral Nightly    tamsulosin  0.4 mg Oral Daily       Social History:     Social History     Socioeconomic History    Marital status:      Spouse name: Not on file    Number of children: Not on file    Years of education: Not on file    Highest education level: Not on file   Occupational History    Not on file   Tobacco Use    Smoking status: Never Smoker    Smokeless tobacco: Never Used   Substance and Sexual Activity    Alcohol use: Yes     Comment: daily 1-2 beer, has not drank in one week     Drug use: Not on file    Sexual activity: Not on file   Other Topics Concern    Not on file   Social History Narrative    Not on file     Social Determinants of Health     Financial Resource Strain:     Difficulty of Paying Living Expenses:    Food Insecurity:     Worried About Running Out of Food in the Last Year:     Ran Out of Food in the Last Year:    Transportation Needs:     Lack of Transportation (Medical):      Lack of Transportation (Non-Medical):    Physical Activity:     Days of Exercise per Week:     Minutes of Exercise per Session:    Stress:     Feeling of Stress :    Social Connections:     Frequency of Communication with Friends and Family:     Frequency of Social Gatherings with Friends and Family:     Attends [employer] Services:     Active Member of Clubs or Organizations:     Attends Club or Organization Meetings:     Marital Status:    Intimate Partner Violence:     Fear of Current or Ex-Partner:     Emotionally Abused:     Physically Abused:     Sexually Abused:        Family History:   History reviewed. No pertinent family history. Allergies: Other     Review of Systems:       Constitutional:Generalized malaise  Head: No headaches  Eyes: No double vision or blurry vision. No conjunctival inflammation. ENT: No sore throat or runny nose. . No hearing loss, tinnitus or vertigo. Cardiovascular: No chest pain or palpitations. Shortness of breath. EUCEDA  Lung:  Shortness of breath and cough. No sputum production  Abdomen: No nausea, vomiting, diarrhea, or abdominal pain. Jerome Gutter No cramps. Genitourinary: No increased urinary frequency, or dysuria. No hematuria. No suprapubic or CVA pain  Musculoskeletal: No muscle aches or pains. No joint effusions, swelling or deformities  Hematologic: No bleeding or bruising. Neurologic: No headache, weakness, numbness, or tingling. Integument: No rash, no ulcers. Psychiatric: No depression. Endocrine: No polyuria, no polydipsia, no polyphagia. Physical Examination :     Patient Vitals for the past 8 hrs:   BP Temp Temp src Pulse Resp SpO2   11/04/21 0319 135/86 98 °F (36.7 °C) Oral 68 22 94 %   11/04/21 0029      91 %   11/04/21 0002     19 95 %   11/03/21 2341 110/72 99.2 °F (37.3 °C) Oral 73 20 92 %   11/03/21 2302     11 95 %     General Appearance: Awake, alert, and in no apparent distress  Head:  Normocephalic, no trauma  Eyes: Pupils equal, round, reactive to light; sclera anicteric; conjunctivae pink. No embolic phenomena. ENT: Oropharynx clear, without erythema, exudate, or thrush. No tenderness of sinuses. Mouth/throat: mucosa pink and moist. No lesions. Dentition in good repair. Neck:Supple, without lymphadenopathy. Thyroid normal, No bruits. Pulmonary/Chest: Diminished to auscultation, without wheezes, rales, or rhonchi. No dullness to percussion.    Cardiovascular: Regular rate and rhythm without murmurs, rubs, or gallops. Abdomen: Soft, non tender. Bowel sounds normal. No organomegaly  All four Extremities: No cyanosis, clubbing, edema, or effusions. Neurologic: No gross sensory or motor deficits. Skin: Warm and dry with good turgor. No signs of peripheral arterial or venous insufficiency. No ulcerations. No open wounds. Medical Decision Making -Laboratory:   I have independently reviewed/ordered the following labs:    CBC with Differential:   Recent Labs     11/03/21  1136   WBC 5.2   HGB 15.6   HCT 46.7   PLT 75*   LYMPHOPCT 23*   MONOPCT 4     BMP:   Recent Labs     11/03/21  1136   *   K 3.5*   CL 87*   CO2 25   BUN 14   CREATININE 1.16     Hepatic Function Panel:   Recent Labs     11/03/21  1136   PROT 6.6   LABALBU 3.3*   BILITOT 0.44   ALKPHOS 52   ALT 91*   *     No results for input(s): RPR in the last 72 hours. No results for input(s): HIV in the last 72 hours. No results for input(s): BC in the last 72 hours. Lab Results   Component Value Date    RBC 5.20 11/03/2021    WBC 5.2 11/03/2021     Lab Results   Component Value Date    CREATININE 1.16 11/03/2021    GLUCOSE 107 11/03/2021       Medical Decision Making-Imaging:     Narrative   EXAMINATION:   ONE XRAY VIEW OF THE CHEST       11/3/2021 5:12 pm       COMPARISON:   11/03/2021, 1155 hours.       HISTORY:   ORDERING SYSTEM PROVIDED HISTORY: hypoxia worsening   TECHNOLOGIST PROVIDED HISTORY:   hypoxia worsening   Reason for Exam: worsening hypoxia   upright port       FINDINGS:   No cardiomegaly or interstitial edema was noted.  Patchy alveolar   infiltration was noted in the left lower lobe and bilateral perihilar   regions.  Partial clearing has occurred from 11/03/2021, 1157 hours.  Marked   degenerative osteo arthritic changes involve the left glenohumeral joint.    The regional skeleton was unremarkable.  No pneumothorax was seen.           Impression   No cardiomegaly or interstitial edema.       Patchy bilateral perihilar and left lower lobe pneumonia like infiltrate was   noted.  Partial clearing has occurred bilaterally since 11/03/2021, 1157   hours.       No pneumothorax.             Medical Decision Qozmla-Sawihcer-Pfogp:       Medical Decision Making-Other:     Note:  · Labs, medications, radiologic studies were reviewed with personal review of films  · Large amounts of data were reviewed  · Discussed with nursing Staff, Discharge planner  · Infection Control and Prevention measures reviewed  · All prior entries were reviewed  · Administer medications as ordered  · Prognosis: Guarded  · Discharge planning reviewed      Thank you for allowing us to participate in the care of this patient. Please call with questions. [employer], POOL - CNP     ATTESTATION:    I have discussed the case, including pertinent history and exam findings with the APRN. I have evaluated the  History, physical findings and pictures of the patient and the key elements of the encounter have been performed by me. I have reviewed the laboratory data, other diagnostic studies and discussed them with the APRN. I have updated the medical record where necessary. I agree with the assessment, plan and orders as documented by the APRN.     Hilario Coyle MD.      Pager: [phone] - Office: [phone]

## 2021-11-04 NOTE — PROGRESS NOTES
Physician Progress Note      Isaías Gaspar  [facility] #:                  [id number]  :                       1965  ADMIT DATE:       11/3/2021 10:59 AM  DISCH DATE:  RESPONDING  PROVIDER #:        Raiza Berumen MD          QUERY TEXT:    Pt admitted with COVID-19 pneumonia and noted to have SIRS. If possible,   please document in progress notes and discharge summary if you are evaluating   and/or treating: The medical record reflects the following:  Risk Factors: COVID-19 pneumonia  Clinical Indicators: Tmax 100.9, RR 20's, WBC 2.7, lactic acid 2.9, procal   1.43, CRP 86.2, ferritin 2936, fibrinogen 538, , acute respiratory   failure  Treatment: Decadron, Actemra, O2 per BIPAP/HHF NC, CXR, labs, continuous pulse   ox, ID consult    Call if any questions. Thank you, Tanisha Wilson, [address] [place] [address]  Options provided:  -- Sepsis present on admission due to COVID-19 pneumonia  -- Covid-19 pneumonia without sepsis  -- Other - I will add my own diagnosis  -- Disagree - Not applicable / Not valid  -- Disagree - Clinically unable to determine / Unknown  -- Refer to Clinical Documentation Reviewer    PROVIDER RESPONSE TEXT:    This patient has sepsis which was present on admission due to COVID-19   pneumonia.     Query created by: Diana Thurman on 2021 8:30 AM      Electronically signed by:  Raiza Berumen MD 2021 12:26 PM

## 2021-11-05 NOTE — PLAN OF CARE
Problem: Airway Clearance - Ineffective  Goal: Achieve or maintain patent airway  11/4/2021 2214 by Shikha Smith RCP  Outcome: Ongoing     Problem: Gas Exchange - Impaired  Goal: Absence of hypoxia  11/4/2021 2214 by Shikha Smith RCP  Outcome: Ongoing     Problem: Gas Exchange - Impaired  Goal: Promote optimal lung function  11/4/2021 2214 by Shikha Smith RCP  Outcome: Ongoing     Problem: Breathing Pattern - Ineffective  Goal: Ability to achieve and maintain a regular respiratory rate  11/4/2021 2214 by Shikha Smith RCP  Outcome: Ongoing     Problem: OXYGENATION/RESPIRATORY FUNCTION  Goal: Patient will maintain patent airway  11/4/2021 2214 by Shikha Smith RCP  Outcome: Ongoing     Problem: OXYGENATION/RESPIRATORY FUNCTION  Goal: Patient will achieve/maintain normal respiratory rate/effort  Description: Respiratory rate and effort will be within normal limits for the patient  11/4/2021 2214 by Shikha Smith RCP  Outcome: Ongoing     PROVIDE ADEQUATE OXYGENATION WITH ACCEPTABLE SP02/ABG'S    [x]  IDENTIFY APPROPRIATE OXYGEN THERAPY  [x]   MONITOR SP02/ABG'S AS NEEDED   [x]   PATIENT EDUCATION AS NEEDED    NON INVASIVE VENTILATION  PROVIDE OPTIMAL VENTILATION/ACCEPTABLE SP02  IMPLEMENT NON INVASIVE VENTILATION PROTOCOL  ASSESSMENT SKIN INTEGRITY  PATIENT EDUCATION AS NEEDED  BIPAP AS NEEDED

## 2021-11-05 NOTE — PLAN OF CARE
Problem: Airway Clearance - Ineffective  Goal: Achieve or maintain patent airway  11/5/2021 0640 by Bernardo Laboy RN  Outcome: Ongoing  11/4/2021 2214 by Brenda Leach RCP  Outcome: Ongoing     Problem: Gas Exchange - Impaired  Goal: Absence of hypoxia  11/5/2021 0640 by Bernardo Laboy RN  Outcome: Ongoing  11/4/2021 2214 by Brenda Leach RCP  Outcome: Ongoing  Goal: Promote optimal lung function  11/5/2021 0640 by Bernardo Laboy RN  Outcome: Ongoing  11/4/2021 2214 by Brenda Leach RCP  Outcome: Ongoing     Problem: Breathing Pattern - Ineffective  Goal: Ability to achieve and maintain a regular respiratory rate  11/5/2021 0640 by Bernardo Laboy RN  Outcome: Ongoing  11/4/2021 2214 by Brenda Leach RCP  Outcome: Ongoing     Problem:  Body Temperature -  Risk of, Imbalanced  Goal: Ability to maintain a body temperature within defined limits  Outcome: Ongoing  Goal: Will regain or maintain usual level of consciousness  Outcome: Ongoing  Goal: Complications related to the disease process, condition or treatment will be avoided or minimized  Outcome: Ongoing     Problem: Isolation Precautions - Risk of Spread of Infection  Goal: Prevent transmission of infection  Outcome: Ongoing     Problem: Nutrition Deficits  Goal: Optimize nutritional status  Outcome: Ongoing     Problem: Risk for Fluid Volume Deficit  Goal: Maintain normal heart rhythm  Outcome: Ongoing  Goal: Maintain absence of muscle cramping  Outcome: Ongoing  Goal: Maintain normal serum potassium, sodium, calcium, phosphorus, and pH  Outcome: Ongoing     Problem: Loneliness or Risk for Loneliness  Goal: Demonstrate positive use of time alone when socialization is not possible  Outcome: Ongoing     Problem: Fatigue  Goal: Verbalize increase energy and improved vitality  Outcome: Ongoing     Problem: Patient Education: Go to Patient Education Activity  Goal: Patient/Family Education  Outcome: Ongoing Problem: SAFETY  Goal: Free from accidental physical injury  Outcome: Ongoing  Goal: Free from intentional harm  Outcome: Ongoing     Problem: DAILY CARE  Goal: Daily care needs are met  Outcome: Ongoing     Problem: PAIN  Goal: Patient's pain/discomfort is manageable  Outcome: Ongoing     Problem: SKIN INTEGRITY  Goal: Skin integrity is maintained or improved  Outcome: Ongoing     Problem: KNOWLEDGE DEFICIT  Goal: Patient/S.O. demonstrates understanding of disease process, treatment plan, medications, and discharge instructions.   Outcome: Ongoing     Problem: DISCHARGE BARRIERS  Goal: Patient's continuum of care needs are met  Outcome: Ongoing     Problem: OXYGENATION/RESPIRATORY FUNCTION  Goal: Patient will maintain patent airway  11/5/2021 0640 by Tigre Cheatham RN  Outcome: Ongoing  11/4/2021 2214 by Elena Saab RCP  Outcome: Ongoing  Goal: Patient will achieve/maintain normal respiratory rate/effort  Description: Respiratory rate and effort will be within normal limits for the patient  11/5/2021 0640 by Tigre Cheatham RN  Outcome: Ongoing  11/4/2021 2214 by Elena Saab RCP  Outcome: Ongoing

## 2021-11-05 NOTE — PLAN OF CARE
Problem: Airway Clearance - Ineffective  Goal: Achieve or maintain patent airway  11/5/2021 0829 by FLORESITA LeeP  Outcome: Ongoing  11/5/2021 0640 by Tigre Cheatham, RN  Outcome: Ongoing  11/4/2021 2214 by Elena Saab RCP  Outcome: Ongoing     Problem: Gas Exchange - Impaired  Goal: Absence of hypoxia  11/5/2021 0829 by Cely Ko RCP  Outcome: Ongoing  11/5/2021 0640 by Tigre Cheatham, RN  Outcome: Ongoing  11/4/2021 2214 by FLORESITA MengP  Outcome: Ongoing  Goal: Promote optimal lung function  11/5/2021 0829 by Cely Ko RCP  Outcome: Ongoing  11/5/2021 0640 by Tigre Cheatham, RN  Outcome: Ongoing  11/4/2021 2214 by Elena Saab RCP  Outcome: Ongoing     Problem: Breathing Pattern - Ineffective  Goal: Ability to achieve and maintain a regular respiratory rate  11/5/2021 0829 by FLORESITA LeeP  Outcome: Ongoing  11/5/2021 0640 by Tigre Cheatham, RN  Outcome: Ongoing  11/4/2021 2214 by Elena Saab RCP  Outcome: Ongoing     Problem:  Body Temperature -  Risk of, Imbalanced  Goal: Ability to maintain a body temperature within defined limits  11/5/2021 0640 by Tigre Cheatham, RN  Outcome: Ongoing  Goal: Will regain or maintain usual level of consciousness  11/5/2021 0640 by Tigre Cheatham, RN  Outcome: Ongoing  Goal: Complications related to the disease process, condition or treatment will be avoided or minimized  11/5/2021 0640 by Tigre Cheatham, RN  Outcome: Ongoing     Problem: Isolation Precautions - Risk of Spread of Infection  Goal: Prevent transmission of infection  11/5/2021 0640 by Tigre Cheatham, RN  Outcome: Ongoing     Problem: Nutrition Deficits  Goal: Optimize nutritional status  11/5/2021 0640 by Tigre Cheatham, RN  Outcome: Ongoing     Problem: Risk for Fluid Volume Deficit  Goal: Maintain normal heart rhythm  11/5/2021 0640 by Tigre Cheatham, RN  Outcome: Ongoing  Goal: Maintain Ongoing  Goal: Patient will achieve/maintain normal respiratory rate/effort  Description: Respiratory rate and effort will be within normal limits for the patient  11/5/2021 0829 by Robinson Bartholomew RCP  Outcome: Ongoing  11/5/2021 0640 by Maile Aguero RN  Outcome: Ongoing  11/4/2021 2214 by Moni Bhakta RCP  Outcome: Ongoing

## 2021-11-05 NOTE — PROGRESS NOTES
[facility]  Office: [address], DO, America Din, DO, Jere Acron, DO, Prasanth Crespo Blood, DO, Emmett Milton MD, Prabhu Herrera MD, Sahra Chinchilla MD, Deshaun Mckeon MD, Shakira Weinstein MD, Branden Gonzalez MD, Rock Nolasco MD, Ethel Howard MD, Michele Everett, DO, Luigi Zhao, DO, Camille Nye MD,  Miah Stringer, DO, Jackson Holbrook MD, Jennifer Thomas MD, Sobeida Best MD, Colton Ibarra MD, Alexandre Aparicio MD, Ranjana Reyna MD, Neha Oviedo, [facility], [facility], CNP, Lesly Cr, CNP, Yuni Gr, CNS, Phu Cardenas, CNP, Samra Jarvis, CNP, Adelia Alpers, CNP, Madi Garay, CNP, Erendira Calhoun, CNP, Kandy Wilson, CNP, Jama Ferguson PA-C, Veronica Boudreaux, [facility], Dejuan Gilmore, CNP, Kamlesh Newsome, CNP, Evan Sun, CNP, Lela Espitia, CNP, Horace Gamez, CNP, Barb Castellanos, [facility], Precious Carrasquillo, [address] [place] [address]    Progress Note    11/5/2021    4:59 PM    Name:   Abdullahi Emerson  MRN:     [id number]     Acct:      [de-identified]   Room:   [address] [place] [address] Day:  2  Admit Date:  11/3/2021 10:59 AM    PCP:   Jacquie Cole DO  Code Status:  Full Code    Subjective:     C/C:   Chief Complaint   Patient presents with    Positive For Covid-19     went to infusion center for covid +, stumbling in with wife, RN checked his oxygen and it was 62s, brought over to ed via wheelchair on 4L via NC, increased to 6L with no change and oxygen in 70s, now on NRB. Interval History Status: not changed. thomas seen and examined. Still on high flow. Received another dose of lasix. Breathing better. Brief History:     Kiel Rodriguez is a 60 y.o. Non- / non  male who presents with Positive For Covid-19 (went to infusion center for covid +, stumbling in with wife, RN checked his oxygen and it was 60s, brought over to ed via wheelchair on 4L via NC, increased to 6L with no change and oxygen in 70s, now on NRB.  ) and is admitted to the hospital for the management of Pneumonia due to COVID-19 virus.     55-year-old male past medical history of obesity, BPH, hypertension presents with worsening shortness of breath was hypoxic. Patient was originally planning to go to the infusion center for being Covid positive patient requiring 4 L nasal cannula worsening to 6 L and now on BiPAP. Actemra was given 11/3/2021. Review of Systems:     Constitutional:  negative for chills, fevers, sweats  Respiratory:  negative for cough, dyspnea on exertion, shortness of breath, wheezing  Cardiovascular:  negative for chest pain, chest pressure/discomfort, lower extremity edema, palpitations  Gastrointestinal:  negative for abdominal pain, constipation, diarrhea, nausea, vomiting  Neurological:  negative for dizziness, headache    Medications: Allergies: Allergies   Allergen Reactions    Other      Hayfever       Current Meds:   Scheduled Meds:    sodium chloride flush  5-40 mL IntraVENous 2 times per day    enoxaparin  30 mg SubCUTAneous BID    dexamethasone  20 mg IntraVENous Q24H    carvedilol  6.25 mg Oral BID    pantoprazole  40 mg Oral QAM AC    levothyroxine  150 mcg Oral Daily    rosuvastatin  40 mg Oral Nightly    tamsulosin  0.4 mg Oral Daily     Continuous Infusions:    sodium chloride       PRN Meds: benzonatate, sodium chloride flush, sodium chloride, ondansetron **OR** ondansetron, polyethylene glycol, acetaminophen **OR** acetaminophen    Data:     Past Medical History:   has a past medical history of Diabetes mellitus ([address] Hyperlipidemia, Hypertension, Kidney stone, and Thyroid disease. Social History:   reports that he has never smoked. He has never used smokeless tobacco. He reports current alcohol use. Family History: History reviewed. No pertinent family history.     Vitals:  /71   Pulse 66   Temp 97.8 °F (36.6 °C) (Oral)   Resp 16   Ht 6' (1.829 m)   Wt 294 lb 3.2 oz (133.4 kg)   SpO2 96%   BMI 39.90 kg/m²   Temp (24hrs), Av.9 °F (36.6 °C), Min:97.4 °F (36.3 °C), Max:98.4 °F (36.9 °C)    No results for input(s): POCGLU in the last 72 hours. I/O (24Hr): Intake/Output Summary (Last 24 hours) at 2021 1659  Last data filed at 2021 1203  Gross per 24 hour   Intake 400 ml   Output 300 ml   Net 100 ml       Labs:  Hematology:  Recent Labs     21  1136 21  0540 21  0846   WBC 5.2 2.7* 6.2   RBC 5.20 5.21 5.20   HGB 15.6 15.8 15.8   HCT 46.7 46.8 47.1   MCV 89.8 89.8 90.6   MCH 30.0 30.3 30.4   MCHC 33.4 33.8 33.5   RDW 13.6 13.5 13.6   PLT 75* 94* 141   MPV 12.3 12.9 12.3   CRP 86.2*  --   --    INR 0.9  --   --    DDIMER 0.93  --   --      Chemistry:  Recent Labs     21  1136 21  1434 21  0540 21  0717   *  --  126* 129*   K 3.5*  --  3.5* 4.0   CL 87*  --  88* 93*   CO2 25  --  23 22   GLUCOSE 107*  --  132* 95   BUN 14  --  16 21*   CREATININE 1.16  --  0.85 0.91   MG  --   --  2.4  --    ANIONGAP 15  --  15 14   LABGLOM >60  --  >60 >60   GFRAA >60  --  >60 >60   CALCIUM 8.1*  --  8.2* 8.2*   TROPHS  --  <6  --   --    LACTACIDWB 2.9*  --   --   --      Recent Labs     21  1136 21  0540   PROT 6.6 6.7   LABALBU 3.3* 3.2*   * 222*   ALT 91* 121*   *  --    ALKPHOS 52 62   BILITOT 0.44 0.48     ABG:  Lab Results   Component Value Date    POCPH 7.503 2021    POCPCO2 32.0 2021    POCPO2 50.9 2021    POCHCO3 25.2 2021    NBEA NOT REPORTED 2021    PBEA 3 2021    WNP8DQZ NOT REPORTED 2021    OOVH7VGK 89 2021    FIO2 70.0 2021     No results found for: SPECIAL  No results found for: CULTURE    Radiology:  XR CHEST (SINGLE VIEW FRONTAL)    Result Date: 11/3/2021  Scattered pulmonary opacities consistent with multifocal airspace disease.      XR CHEST PORTABLE    Result Date: 2021  Increased bilateral airspace opacities suggesting worsening COVID pneumonia XR CHEST PORTABLE    Result Date: 11/3/2021  No cardiomegaly or interstitial edema. Patchy bilateral perihilar and left lower lobe pneumonia like infiltrate was noted. Partial clearing has occurred bilaterally since 11/03/2021, 1157 hours. No pneumothorax. Physical Examination:        General appearance:  alert, cooperative and on high flow  Mental Status:  oriented to person, place and time and normal affect  Lungs:  cdistant breath sounds   Heart:  regular rate and rhythm, no murmur  Abdomen:  soft, nontender, nondistended, normal bowel sounds  Extremities:  no edema, redness, tenderness in the calves  Skin:  no gross lesions, rashes, induration    Assessment:        Hospital Problems         Last Modified POA    * (Principal) Acute respiratory failure due to COVID-19 ([facility] [address] 11/3/2021 Yes    Benign prostatic hyperplasia with lower urinary tract symptoms 11/3/2021 Yes    Obesity (BMI 35.0-39.9 without comorbidity) 11/3/2021 Yes    Acquired hypothyroidism 11/4/2021 Yes    Obstructive sleep apnea syndrome 11/4/2021 Yes          Plan:        1. Acute respiratory failure secondary to COVID-19.  high dose decadron Actemra 11/3/2021. Appreciate infectious disease pulmonary recommendations. 2. Hypothyroidism continue Synthroid  3. GERD continue Protonix  4. Hypertension Coreg, monitor blood pressure  5. Repeat lasix again 40 mg IV  6. BPH. Flomax  7.  PT/OT when tolerating    Camille Nye MD  11/5/2021  4:59 PM

## 2021-11-05 NOTE — CARE COORDINATION
Pt currently on high flow 90% 60L. Previous plan home independently.  Follow for possible LTACH needs

## 2021-11-05 NOTE — PROGRESS NOTES
[facility] [address] Patient  Today's Date and Time: 11/5/2021, 10:35 AM    Impression :   · COVID 19 Suspect  · COVID 19 Confirmed Infection  · Covid tests:  · 11/1/21: Positive  · 11/4/21: Positive  · Hyponatremia  · Elevated inflammatory markers  · Thrombocytopenia  · Transaminitis  · Essential HTN  · BPH  · Obesity  Recommendations:   · Antibiotic treatment:  · Monitor off antibiotics  · Covid Rx:  · Clinical Research will approach patient to explore if he qualifies for any of the COVID 19 treatment protocols. · Remdesivir-contraindicated because of transaminitis  · Decadron-initiated 11/3/21  · Actemra-Ordered per primary on 11/3/21    Medical Decision Making/Summary/Discussion:11/5/2021     · Patient admitted with suspected COVID 19 infection  · Patient states he tested positive on 11/1/21-Rapid test ordered 11/4/21  · Patient may come out of isolation on 11/22/21    Infection Control Recommendations   · Universal Precautions  · Airborne isolation  · Droplet Isolation    Antimicrobial Stewardship Recommendations     · Discontinuation of therapy  Coordination of Outpatient Care:   · Estimated Length of IV antimicrobials:TBD  · Patient will need Midline Catheter Insertion: TBD  · Patient will need PICC line Insertion: No  · Patient will need: Home IV , [place],  SNF,  LTAC:TBD  · Patient will need outpatient wound care:No    Chief complaint/reason for consultation:   · Concern for COVID infection      History of Present Illness:   Rosemary Hernandez is a 60y.o.-year-old male who was initially admitted on 11/3/2021. Patient seen at the request of Dr. Zackary Gar:    Patient, who stated he tested positive for Covid on 11/1/21, presented to the infusion center on 11/3/21 for the monoclonal antibody infusion. His symptoms started on 10/28/21. The RN noted his SpO2 was in the low 60s on room air.  He was placed on nasal cannula and brought to the ED.    While in the ED, his oxygen did not improve with NC and he was put on non-re-breather and then BiPAP. His CRP was 86.2. The patient was started on Decadron and administered Actemra. CXR 11/3/21  Patchy bilateral perihilar and left lower lobe pneumonia like infiltrate      Patient admitted because of concerns with COVID 19.    CURRENT EVALUATION : 11/5/2021    Afebrile  VS stable    The patient continues on hi-flow NC  He received Actemra on 11/3/21    Patient exhibiting respiratory distress. yes  Respiratory secretions:     Patient receiving supplemental oxygen. Hi-flow NC  RR: 16-->23  02 sat: 95-->93    % FIO2: 90  Flow rate: 60 L/min  PEEP:      QTc:       NEWS Score: 0-4 Low risk group; 5-6: Medium risk group; 7 or above: High risk group  Parameters 3 2 1 0 1 2 3   Age    < 65   ? 65   RR ? 8  9-11 12-20  21-24 ? 25   O2 Sats ? 91 92-93 94-95 ? 96      Suppl O2  Yes  No      SBP ? 90  101-110 111-219   ? 220   HR ? 40  41-50 51-90  111-130 ? 131   Consciousness    Alert   Drowsiness, lethargy, or confusion   Temperature ? 35.0 C (95.0 F)  35.1-36.0 C 95.1-96.9 F 36.1-38.0 C 97.0-100.4 F 38.1-39.0 C 100.5-102.3 F ? 39.1 C ? 102.4 F      NEWS Score:   11/4/21: 3 Low risk    Overall Daily Picture:      Unchanged    Presence of secondary bacterial Infection:    No   Additional antibiotics: No    Labs, X rays reviewed: 11/5/2021    Na: 126-->129  BUN:16-->21  Cr:0.85-->0.91    WBC:2.7-->6.2  Hb:15.8  Plat: 94-->141    Absolute Neutrophils:1.72  Absolute Lymphocytes:0.68  Neutrophil/Lymphocyte Ratio: 2.5 low risk    CRP:86.2  Ferritin:2936  LDH: 794    Pro Calcitonin:      Cultures:  Urine:  ·   Blood:  ·   Sputum :  ·   Wound:       CXR:   11/3/21    CAT:      Discussed with patient, RN, CC, IM. I have personally reviewed the past medical history, past surgical history, medications, social history, and family history, and I have updated the database accordingly.   Past Medical History: Meetings:     Marital Status:    Intimate Partner Violence:     Fear of Current or Ex-Partner:     Emotionally Abused:     Physically Abused:     Sexually Abused:        Family History:   History reviewed. No pertinent family history. Allergies: Other     Review of Systems:       Constitutional:Generalized malaise  Head: No headaches  Eyes: No double vision or blurry vision. No conjunctival inflammation. ENT: No sore throat or runny nose. . No hearing loss, tinnitus or vertigo. Cardiovascular: No chest pain or palpitations. Shortness of breath. EUCEDA  Lung:  Shortness of breath and cough. No sputum production  Abdomen: No nausea, vomiting, diarrhea, or abdominal pain. Lora Remedies No cramps. Genitourinary: No increased urinary frequency, or dysuria. No hematuria. No suprapubic or CVA pain  Musculoskeletal: No muscle aches or pains. No joint effusions, swelling or deformities  Hematologic: No bleeding or bruising. Neurologic: No headache, weakness, numbness, or tingling. Integument: No rash, no ulcers. Psychiatric: No depression. Endocrine: No polyuria, no polydipsia, no polyphagia. Physical Examination :     Patient Vitals for the past 8 hrs:   BP Temp Temp src Pulse Resp SpO2   11/05/21 0828     16 96 %   11/05/21 0753    66     11/05/21 0751    69     11/05/21 0737 105/71 97.8 °F (36.6 °C) Oral 68 18 93 %   11/05/21 0319    71 20 (!) 86 %   11/05/21 0300 107/75 98.4 °F (36.9 °C) Oral 69 17 (!) 87 %     General Appearance: Awake, alert, and in no apparent distress  Head:  Normocephalic, no trauma  Eyes: Pupils equal, round, reactive to light; sclera anicteric; conjunctivae pink. No embolic phenomena. ENT: Oropharynx clear, without erythema, exudate, or thrush. No tenderness of sinuses. Mouth/throat: mucosa pink and moist. No lesions. Dentition in good repair. Neck:Supple, without lymphadenopathy. Thyroid normal, No bruits.   Pulmonary/Chest: Diminished to auscultation, without wheezes, rales, or rhonchi. No dullness to percussion. Cardiovascular: Regular rate and rhythm without murmurs, rubs, or gallops. Abdomen: Soft, non tender. Bowel sounds normal. No organomegaly  All four Extremities: No cyanosis, clubbing, edema, or effusions. Neurologic: No gross sensory or motor deficits. Skin: Warm and dry with good turgor. No signs of peripheral arterial or venous insufficiency. No ulcerations. No open wounds. Medical Decision Making -Laboratory:   I have independently reviewed/ordered the following labs:    CBC with Differential:   Recent Labs     11/04/21  0540 11/05/21  0846   WBC 2.7* 6.2   HGB 15.8 15.8   HCT 46.8 47.1   PLT 94* 141   LYMPHOPCT 25 13*   MONOPCT 11 3     BMP:   Recent Labs     11/04/21  0540 11/05/21  0717   * 129*   K 3.5* 4.0   CL 88* 93*   CO2 23 22   BUN 16 21*   CREATININE 0.85 0.91   MG 2.4  --      Hepatic Function Panel:   Recent Labs     11/03/21  1136 11/04/21  0540   PROT 6.6 6.7   LABALBU 3.3* 3.2*   BILITOT 0.44 0.48   ALKPHOS 52 62   ALT 91* 121*   * 222*     No results for input(s): RPR in the last 72 hours. No results for input(s): HIV in the last 72 hours. No results for input(s): BC in the last 72 hours.   Lab Results   Component Value Date    RBC 5.20 11/05/2021    WBC 6.2 11/05/2021     Lab Results   Component Value Date    CREATININE 0.91 11/05/2021    GLUCOSE 95 11/05/2021       Medical Decision Making-Imaging:     Narrative   EXAMINATION:   ONE XRAY VIEW OF THE CHEST       11/3/2021 5:12 pm       COMPARISON:   11/03/2021, 1155 hours.       HISTORY:   ORDERING SYSTEM PROVIDED HISTORY: hypoxia worsening   TECHNOLOGIST PROVIDED HISTORY:   hypoxia worsening   Reason for Exam: worsening hypoxia   upright port       FINDINGS:   No cardiomegaly or interstitial edema was noted.  Patchy alveolar   infiltration was noted in the left lower lobe and bilateral perihilar   regions.  Partial clearing has occurred from 11/03/2021, 1157 hours. Noris Graves degenerative osteo arthritic changes involve the left glenohumeral joint. The regional skeleton was unremarkable.  No pneumothorax was seen.           Impression   No cardiomegaly or interstitial edema.       Patchy bilateral perihilar and left lower lobe pneumonia like infiltrate was   noted.  Partial clearing has occurred bilaterally since 11/03/2021, 1157   hours.       No pneumothorax.             Medical Decision Wzmdiz-Yypvrhmn-Lmvpb:       Medical Decision Making-Other:     Note:  · Labs, medications, radiologic studies were reviewed with personal review of films  · Large amounts of data were reviewed  · Discussed with nursing Staff, Discharge planner  · Infection Control and Prevention measures reviewed  · All prior entries were reviewed  · Administer medications as ordered  · Prognosis: Guarded  · Discharge planning reviewed      Thank you for allowing us to participate in the care of this patient. Please call with questions. POOL Sanchez - CNP       ATTESTATION:    I have discussed the case, including pertinent history and exam findings with the APRN. I have evaluated the  History, physical findings and pictures of the patient and the key elements of the encounter have been performed by me. I have reviewed the laboratory data, other diagnostic studies and discussed them with the APRN. I have updated the medical record where necessary. I agree with the assessment, plan and orders as documented by the APRN.     Tam Raygoza MD.          Pager: [phone] - Office: [phone]

## 2021-11-05 NOTE — PROGRESS NOTES
PULMONARY & CRITICAL CARE MEDICINE PROGRESS NOTE     Patient:  Estuardo Sousa  MRN: [id number]  Admit date: 11/3/2021  Primary Care Physician: Chetan Mckeon DO  Consulting Physician: Maura Yoder MD  CODE Status: Full Code  LOS: 2     SUBJECTIVE     CHIEF COMPLAINT/REASON FOR INITIAL CONSULT:   Acute respiratory failure/COVID-19 pneumonia    BRIEF HOSPITAL COURSE:   The patient is a 60 y.o. male history of obesity, diabetes, hypertension, hyperlipidemia was recently diagnosed to have COVID-19. He went to the infusion center for antibody cocktail. However he was found to be hypoxemic with oxygen saturation in 60s. He was sent to the ER and hospitalized for COVID-19 pneumonia. He was initially on nasal cannula. Subsequently needed initiation of BiPAP. At the time of my evaluation patient was on high flow nasal cannula. Sitting comfortably in the bed. He denies any significant dyspnea. Has productive cough with sputum. He denies any history of sleep apnea. INTERVAL HISTORY:  11/05/21  Overnight events noted, chart reviewed, labs seen and imaging studies reviewed. Patient is on high flow nasal cannula has been 100% and 50 L this morning he was on 60 L and 90%. She he did not use BiPAP last night according to patient he was not able to tolerate given sore throat. Overnight he was reported to have desaturation increasing FiO2 from 70% to 90% and this morning he saturating better above 94%. He was able to ambulate from bed to chair this morning. He does have cough. Denies sputum production denies chest pain or hemoptysis does have shortness of breath on any ambulation. Labs shows WBC better 6.2 hemoglobin 15.8 platelet 652. Sodium 129 improved from 126 BUN 21 creatinine 0.91 bicarbonate 22  Chest x-ray 11/04/2021 as compared to 11/03/2021 shows similar finding with bilateral infiltrate and opacities.     According patient he had history of smoking in the past per patient he is not on before 5 6 years quit few years ago never diagnosed with COPD or asthma but he was having some symptoms and was started on Trelegy by primary care physician few months ago which she take once a day      REVIEW OF SYSTEMS:  Review of Systems   Constitutional: Positive for appetite change and fatigue. Negative for fever. HENT: Negative for postnasal drip, sinus pain, sore throat, trouble swallowing and voice change. Eyes: Negative for photophobia, redness and visual disturbance. Respiratory: Positive for cough and shortness of breath. Negative for wheezing. Cardiovascular: Negative for chest pain, palpitations and leg swelling. Gastrointestinal: Negative for abdominal pain, blood in stool, diarrhea and vomiting. Endocrine: Negative for polydipsia, polyphagia and polyuria. Genitourinary: Negative for dysuria, frequency and hematuria. Musculoskeletal: Negative. Allergic/Immunologic: Negative. Neurological: Negative for dizziness, syncope, speech difficulty and headaches. Hematological: Negative for adenopathy. Does not bruise/bleed easily. Psychiatric/Behavioral: Negative.         OBJECTIVE     PaO2/FiO2 RATIO:  Recent Labs     21  1838   POCPO2 50.9*      FiO2 : (S) 90 % (SpO2 98%)     VITAL SIGNS:   LAST:  /71   Pulse 66   Temp 97.8 °F (36.6 °C) (Oral)   Resp 16   Ht 6' (1.829 m)   Wt 294 lb 3.2 oz (133.4 kg)   SpO2 96%   BMI 39.90 kg/m²   8-24 HR RANGE:  TEMP Temp  Av.9 °F (36.6 °C)  Min: 97.4 °F (36.3 °C)  Max: 98.6 °F (37 °C)   BP Systolic (65MOI), WHR:882 , Min:99 , IIY:971      Diastolic (29PJL), PXJ:69, Min:70, Max:80     PULSE Pulse  Av.9  Min: 59  Max: 71   RR Resp  Av  Min: 16  Max: 18   O2 SAT SpO2  Av.5 %  Min: 93 %  Max: 96 %   OXYGEN DELIVERY O2 Flow Rate (L/min)  Av L/min  Min: 60 L/min  Max: 60 L/min        SYSTEMIC EXAMINATION:   General appearance - Ill-appearing, mild to moderate respiratory distress  Mental status - awake & alert, follows commands  Eyes - pupils equal and reactive, sclera anicteric  Mouth - mucous membranes moist, pharynx normal without lesions. Large tongue  Neck - supple, no significant adenopathy, carotids upstroke normal bilaterally, no bruits  Chest - Breath sounds bilaterally were dimnished to auscultation at bases. There were mild crackles present at bases, no wheezes, rhonchi. There is no intercostal recession or use of accessory muscles  Heart - normal rate, regular rhythm, normal S1, S2, no murmurs, rubs, clicks or gallops  Abdomen - soft, nontender, nondistended, no masses or organomegaly  Neurological - non-focal.  Cranial nerves intact no gross motor neuro deficit  Extremities - peripheral pulses normal, no pedal edema, no clubbing or cyanosis  Skin - normal coloration and turgor, no rashes, no suspicious skin lesions noted     DATA REVIEW     Medications: Current Inpatient  Scheduled Meds:   sodium chloride flush  5-40 mL IntraVENous 2 times per day    enoxaparin  30 mg SubCUTAneous BID    dexamethasone  20 mg IntraVENous Q24H    carvedilol  6.25 mg Oral BID    pantoprazole  40 mg Oral QAM AC    levothyroxine  150 mcg Oral Daily    rosuvastatin  40 mg Oral Nightly    tamsulosin  0.4 mg Oral Daily     Continuous Infusions:   sodium chloride         INPUT/OUTPUT:  No intake/output data recorded.        LABS:  ABGs:   Recent Labs     11/03/21  1838   POCPH 7.503*   POCPCO2 32.0*   POCPO2 50.9*   POCHCO3 25.2   OMRJ0UYO 89*     CBC:   Recent Labs     11/03/21  1136 11/04/21  0540 11/05/21  0846   WBC 5.2 2.7* 6.2   HGB 15.6 15.8 15.8   HCT 46.7 46.8 47.1   MCV 89.8 89.8 90.6   PLT 75* 94* 141   LYMPHOPCT 23* 25 13*   RBC 5.20 5.21 5.20   MCH 30.0 30.3 30.4   MCHC 33.4 33.8 33.5   RDW 13.6 13.5 13.6     CRP:   Recent Labs     11/03/21  1136   CRP 86.2*     LDH:   Recent Labs     11/03/21  1136   *     BMP:   Recent Labs     11/03/21  1136 11/04/21  0540 11/05/21  0717   * 126* 129*   K 3.5* 3.5* 4.0 CL 87* 88* 93*   CO2 25 23 22   BUN 14 16 21*   CREATININE 1.16 0.85 0.91   GLUCOSE 107* 132* 95     Liver Function Test:   Recent Labs     11/03/21  1136 11/04/21  0540   PROT 6.6 6.7   LABALBU 3.3* 3.2*   ALT 91* 121*   * 222*   ALKPHOS 52 62   BILITOT 0.44 0.48     Coagulation Profile:   Recent Labs     11/03/21  1136   INR 0.9   PROTIME 10.2     D-Dimer:  Recent Labs     11/03/21  1136   DDIMER 0.93     Ferritin:    Recent Labs     11/03/21  1136   FERRITIN 2,936*     Lactic Acid:  Recent Labs     11/03/21  1136   LACTA NOT REPORTED     Cardiac Enzymes:  No results for input(s): CKTOTAL, CKMB, CKMBINDEX, TROPONINI in the last 72 hours. Invalid input(s): TROPONIN, HSTROP  BNP/ProBNP:   No results for input(s): BNP, PROBNP in the last 72 hours. Triglycerides:  No results for input(s): TRIG in the last 72 hours. Microbiology:  Urine Culture:  No components found for: CURINE  Blood Culture:  No components found for: CBLOOD, CFUNGUSBL  Sputum Culture:  No components found for: CSPUTUM  Recent Labs     11/04/21  1436   [facility] . NASOPHARYNGEAL SWAB     Recent Labs     11/04/21  1436   [facility] . NASOPHARYNGEAL SWAB        Pathology:    Radiology Reports:  XR CHEST PORTABLE   Final Result   Increased bilateral airspace opacities suggesting worsening COVID pneumonia         XR CHEST PORTABLE   Final Result   No cardiomegaly or interstitial edema. Patchy bilateral perihilar and left lower lobe pneumonia like infiltrate was   noted. Partial clearing has occurred bilaterally since 11/03/2021, 1157   hours. No pneumothorax. XR CHEST (SINGLE VIEW FRONTAL)   Final Result   Scattered pulmonary opacities consistent with multifocal airspace disease. Echocardiogram:   No results found for this or any previous visit.        ASSESSMENT AND PLAN     Assessment:    // Acute hypoxic respiratory failure  // Acute respiratory distress syndrome  // Bilateral multifocal pneumonia due to COVID 19 infection  // Sepsis due to COVID 19  // Probable obstructive sleep apnea  // Hypertension  // Diabetes mellitus  // Morbid obesity    Plan:    I personally interviewed/examined the patient; reviewed interval history, interpreted all available radiographic and laboratory data at the time of service.  Patient remains on high flow nasal cannula and he is on 60 L 90% currently on 50 L and 90% and saturating above 92%.  He does desaturate with slight ambulation and out bed to chair.  Continue to encourage nocturnal NIV/BiPAP and intermittently during the daytime. And wean   Continue supplemental oxygen to keep oxygen saturation >90%   Received Actemra 11/03/2021   He is on IV Decadron per hospital protocol currently on 20 mg once daily   Encourage prone position when sleeping, incentive spirometry   Continue pulmonary toilet, aspiration precautions and bronchodilators   Consider intermittent diuresis with monitoring of intake and output electrolytes   Monitor I/O, electrolytes with a goal of negative fluid balance   Chemical DVT prophylaxis as per protocol on Lovenox 30 mg twice daily   Antimicrobials reviewed; currently not on antibiotic   Monitor CRP, LDH, AST/ALT, D-Dimer, Ferritin periodically   Physical/occupational therapy    The patient is/remains critically ill with illness/injury that acutely impairs one or more vital organ systems, such that there is a high probability of imminent or life threatening deterioration in the patient's condition. Critical care time of 35 minutes was spent (excluding procedures), in coordination of care during bedside rounds and discussion of patient care in detail, and recommendations of the team were adopted in the plan. Necessity of all invasive devices was also confirmed.      Dajuan Street MD   Pulmonary and Critical Care Medicine           11/5/2021, 11:40 AM    This patient was evaluated in the context of the global SARS-CoV-2 (COVID-19) pandemic, which necessitated considerations that the patient either has COVID-19 infection or is at risk of infection with COVID-19. Institutional protocols and algorithms that pertain to the evaluation & management of patients with COVID-19 or those at risk for COVID-19 are in a state of rapid changes based on information released by regulatory bodies including the CDC and federal and state organizations. These policies and algorithms were followed during the patient's care. Please note that this chart was generated using voice recognition Dragon dictation software. Although every effort was made to ensure the accuracy of this automated transcription, some errors in transcription may have occurred.

## 2021-11-05 NOTE — CONSULTS
PULMONARY & CRITICAL CARE MEDICINE CONSULT NOTE     Patient:  Rosemary Hernandez  MRN: [id number]  Admit date: 11/3/2021  Primary Care Physician: Veronica Fong DO  Consulting Physician: Yan Corcoran MD  CODE Status: Full Code  LOS: 1     SUBJECTIVE     CHIEF COMPLAINT/REASON FOR CONSULT: Acute respiratory failure/COVID-19 pneumonia    HISTORY OF PRESENT ILLNESS:  The patient is a 60 y.o. male with history of obesity, diabetes, hypertension, hyperlipidemia was recently diagnosed to have COVID-19. He went to the infusion center for antibiotic cocktail. However he was found to be hypoxemic with oxygen saturation in 60s. He was sent to the ER and hospitalized for COVID-19 pneumonia. He was initially on nasal cannula. Subsequently needed initiation of BiPAP. At the time of my evaluation patient was on high flow nasal cannula. Sitting comfortably in the bed. He denies any significant dyspnea. Has productive cough with sputum. He denies any history of sleep apnea. PAST MEDICAL HISTORY:        Diagnosis Date    Diabetes mellitus ([address]     Hyperlipidemia     Hypertension     Kidney stone     Thyroid disease      PAST SURGICAL HISTORY:        Procedure Laterality Date    HERNIA REPAIR      SHOULDER ARTHROPLASTY       FAMILY HISTORY:   History reviewed. No pertinent family history. SOCIAL HISTORY:   TOBACCO:   reports that he has never smoked. He has never used smokeless tobacco.  ETOH:  reports current alcohol use. DRUGS: has no history on file for drug use. ALLERGIES:    Allergies   Allergen Reactions    Other      Hayfever         HOME MEDICATIONS:  Prior to Admission medications    Medication Sig Start Date End Date Taking?  Authorizing Provider   fluticasone-umeclidin-vilant (TRELEGY ELLIPTA) 100-62.5-25 MCG/INH AEPB Inhale 1 puff into the lungs daily 7/26/21  Yes Historical Provider, MD   allopurinol (ZYLOPRIM) 300 MG tablet Take 300 mg by mouth daily 8/31/21   Historical Provider, MD cariprazine hcl (VRAYLAR) 1.5 MG capsule Take 1.5 mg by mouth daily    Historical Provider, MD   carvedilol (COREG) 6.25 MG tablet Take 6.25 mg by mouth 2 times daily 21   Historical Provider, MD   co-enzyme Q-10 30 MG capsule Take 30 mg by mouth 3 times daily    Historical Provider, MD   esomeprazole (NEXIUM) 20 MG delayed release capsule Take 20 mg by mouth every morning (before breakfast)    Historical Provider, MD   levocetirizine (XYZAL) 5 MG tablet Take 5 mg by mouth every evening    Historical Provider, MD   Levothyroxine Sodium 150 MCG CAPS Take 150 mcg by mouth daily    Historical Provider, MD   olmesartan-amLODIPine-HCTZ 40-10-25 MG TABS 1 tablet 10/27/21   Historical Provider, MD   Melatonin 10 MG TABS Take 10 mg by mouth nightly as needed    Historical Provider, MD   ondansetron (ZOFRAN-ODT) 4 MG disintegrating tablet Take 1 tablet by mouth every 6 hours as needed 21   Historical Provider, MD   potassium citrate (UROCIT-K) 10 MEQ (1080 MG) extended release tablet Take 10 mEq by mouth daily 10/5/21   Historical Provider, MD   rosuvastatin (CRESTOR) 40 MG tablet Take 40 mg by mouth nightly 10/28/21   Historical Provider, MD   tamsulosin (FLOMAX) 0.4 MG capsule Take 0.4 mg by mouth daily    Historical Provider, MD     IMMUNIZATIONS:    There is no immunization history on file for this patient.   REVIEW OF SYSTEMS:  Review of Systems    OBJECTIVE     PaO2/FiO2 RATIO:  Recent Labs     21  1838   POCPO2 50.9*      FiO2 : 92 % (airvo)     VITAL SIGNS:   LAST:  /71   Pulse 64   Temp 97.4 °F (36.3 °C) (Oral)   Resp 15   Ht 6' (1.829 m)   Wt 294 lb 3.2 oz (133.4 kg)   SpO2 (!) 89%   BMI 39.90 kg/m²   8-24 HR RANGE:  TEMP Temp  Av.1 °F (36.7 °C)  Min: 97.4 °F (36.3 °C)  Max: 99.2 °F (69.8 °C)   BP Systolic (62KOF), VGI:363 , Min:99 , LPQ:737      Diastolic (23MFG), VOO:92, Min:71, Max:86     PULSE Pulse  Av  Min: 59  Max: 73   RR Resp  Av.3  Min: 12  Max: 15   O2 SAT RBC 5.20 5.21   MCH 30.0 30.3   MCHC 33.4 33.8   RDW 13.6 13.5     CRP:   Recent Labs     11/03/21  1136   CRP 86.2*     LDH:   Recent Labs     11/03/21  1136   *     BMP:   Recent Labs     11/03/21  1136 11/04/21  0540   * 126*   K 3.5* 3.5*   CL 87* 88*   CO2 25 23   BUN 14 16   CREATININE 1.16 0.85   GLUCOSE 107* 132*     Liver Function Test:   Recent Labs     11/03/21  1136 11/04/21  0540   PROT 6.6 6.7   LABALBU 3.3* 3.2*   ALT 91* 121*   * 222*   ALKPHOS 52 62   BILITOT 0.44 0.48     Coagulation Profile:   Recent Labs     11/03/21  1136   INR 0.9   PROTIME 10.2     D-Dimer:  Recent Labs     11/03/21  1136   DDIMER 0.93     Ferritin:    Recent Labs     11/03/21  1136   FERRITIN 2,936*     Lactic Acid:  Recent Labs     11/03/21  1136   LACTA NOT REPORTED     Cardiac Enzymes:  No results for input(s): CKTOTAL, CKMB, CKMBINDEX, TROPONINI in the last 72 hours. Invalid input(s): TROPONIN, HSTROP  BNP/ProBNP:   No results for input(s): BNP, PROBNP in the last 72 hours. Triglycerides:  No results for input(s): TRIG in the last 72 hours. Microbiology:  Urine Culture:  No components found for: CURINE  Blood Culture:  No components found for: CBLOOD, CFUNGUSBL  Sputum Culture:  No components found for: CSPUTUM  Recent Labs     11/04/21  1436   [address] [place] [address] . NASOPHARYNGEAL SWAB     No results for input(s): SPUTUM, SPECIAL, CULTURE, STATUS, ORG, CDIFFTOXPCR, MPNEUM, MPNEUG in the last 72 hours. Invalid input(s): Michial Lips, CFUNGUSBL,  [address] [place] [address]     Radiology Reports:  XR CHEST PORTABLE   Final Result   Increased bilateral airspace opacities suggesting worsening COVID pneumonia         XR CHEST PORTABLE   Final Result   No cardiomegaly or interstitial edema. Patchy bilateral perihilar and left lower lobe pneumonia like infiltrate was   noted. Partial clearing has occurred bilaterally since 11/03/2021, 1157   hours. No pneumothorax.          XR CHEST (SINGLE VIEW FRONTAL)   Final

## 2021-11-06 NOTE — FLOWSHEET NOTE
SPIRITUAL CARE PROGRESS NOTE  SUMMARY:Patient reports some lonliness but coping strongly in spite of the rigors of hospitaliization.  Spiritual Assessment: Patient was welcoming and receptive to a visit from [facility]. Patient engaged in conversation and shared struggles surrounding his current hospitalization. Patient expressed feeling \"ok\". Patient denies any emotional distress or any profoundly negative feelings. Patient reports he is Confucianism but not practicing. Patient denies experiencing any spiritual distress at this time. Patient is well connected with friends and family who are communicating with the patient regularly.  Intervention: I provided compassionate/active listening and validated patient's feelings, concerns, and experiences.  inquired about patient's support network.  facilitated discussion related to sylvia issues and offered a prayer. Patient welcomed a prayer and we prayed for his health and for his family during this difficult time.  Outcome: Patient expressed gratitude for my visit. Patient cathartically expressed emotions and concerns. Patient is aware that [facility] is available 24\7 if he would like to receive another visit from a  or if there are any other spiritual services that our department could provide. 11/06/21 0909   Encounter Summary   Services provided to: Patient   Referral/Consult From: [facility]   Support System Spouse; Family members   Continue Visiting   (11/6/21)   Complexity of Encounter Low   Length of Encounter 15 minutes   Spiritual Assessment Completed Yes   Routine   Type Initial   Assessment Calm; Approachable; Loneliness; Coping   Intervention Explored feelings, thoughts, concerns;  Active listening; Prayer; Discussed belief system/[employer] practices/sylvia   Outcome Expressed gratitude; Comfort       Electronically signed by Chaplain Resident Segundo Macias, [address] 11/6/2021 at 9:11 AM   Spiritual [address]  [phone]

## 2021-11-06 NOTE — PROGRESS NOTES
[facility]  Office: [address], DO, Mayra Carlos, DO, Shanique Guido, DO, Kay Howard Joseline, DO, Bela Andrews MD, Edith Resendiz MD, Cathi Grace MD, José Manuel Carrera MD, Velta Bernheim, MD, Tyree Parsons MD, Vanessa Dias MD, Mike Fairchild MD, Sarai Rosales, DO, Carmella Wesley, DO, Citlalli Marina MD,  Raven Barton, DO, Rosy Schirmer, MD, Israel Allen MD, Elvi Vernon MD, Silvana Samson MD, Shereen Perdomo MD, Keyshawn Bravo MD, Ann Barbosa, [facility], [facility], CNP, Shawn Rodarte, CNP, Yan Shell, CNS, Frank Glover, CNP, Mimi Ortega, CNP, Melanie Chairez, CNP, Christie Casas, CNP, Katelynn Macdonald, CNP, Dedra Allen, CNP, Herve Schwab PABoogieC, Catrachito Angeles, [facility], Trudy Yip, CNP, Chandni Rich, CNP, Rachel Huang, CNP, Cathryn Álvarez, CNP, Nika Rangel, CNP, Suzan Purdy, CNP, Xiomara Pickering, [address] [place] [address]    Progress Note    11/6/2021    11:55 AM    Name:   Al Shultz  MRN:     [id number]     Acct:      [de-identified]   Room:   [address] [place] [address] Day:  3  Admit Date:  11/3/2021 10:59 AM    PCP:   Charlene Guzman DO  Code Status:  Full Code    Subjective:     C/C:   Chief Complaint   Patient presents with    Positive For Covid-19     went to infusion center for covid +, stumbling in with wife, RN checked his oxygen and it was 62s, brought over to ed via wheelchair on 4L via NC, increased to 6L with no change and oxygen in 70s, now on NRB. Interval History Status: not changed. pateitn seen and examinedsititng in chair. On high flow, cough better. SOB improving, trying to wean down on high flow. Brief History:     Liz Bains is a 60 y.o.  Non- / non  male who presents with Positive For Covid-19 (went to infusion center for covid +, stumbling in with wife, RN checked his oxygen and it was 62s, brought over to ed via wheelchair on 4L via NC, increased to 6L with no change and oxygen in 76s, now on NRB. )   and is admitted to the hospital for the management of Pneumonia due to COVID-19 virus.     27-year-old male past medical history of obesity, BPH, hypertension presents with worsening shortness of breath was hypoxic. Patient was originally planning to go to the infusion center for being Covid positive patient requiring 4 L nasal cannula worsening to 6 L and now on BiPAP. Actemra was given 11/3/2021. Review of Systems:     Constitutional:  negative for chills, fevers, sweats  Respiratory:  negative for cough, dyspnea on exertion, shortness of breath, wheezing  Cardiovascular:  negative for chest pain, chest pressure/discomfort, lower extremity edema, palpitations  Gastrointestinal:  negative for abdominal pain, constipation, diarrhea, nausea, vomiting  Neurological:  negative for dizziness, headache    Medications: Allergies: Allergies   Allergen Reactions    Other      Hayfever       Current Meds:   Scheduled Meds:    sodium chloride flush  5-40 mL IntraVENous 2 times per day    enoxaparin  30 mg SubCUTAneous BID    dexamethasone  20 mg IntraVENous Q24H    carvedilol  6.25 mg Oral BID    pantoprazole  40 mg Oral QAM AC    levothyroxine  150 mcg Oral Daily    rosuvastatin  40 mg Oral Nightly    tamsulosin  0.4 mg Oral Daily     Continuous Infusions:    sodium chloride       PRN Meds: benzonatate, sodium chloride flush, sodium chloride, ondansetron **OR** ondansetron, polyethylene glycol, acetaminophen **OR** acetaminophen    Data:     Past Medical History:   has a past medical history of Diabetes mellitus ([address] Hyperlipidemia, Hypertension, Kidney stone, and Thyroid disease. Social History:   reports that he has never smoked. He has never used smokeless tobacco. He reports current alcohol use. Family History: History reviewed. No pertinent family history.     Vitals:  BP (!) 107/93   Pulse 80   Temp 97.9 °F (36.6 °C) (Oral)   Resp 26   Ht 6' (1.829 m)   Wt 294 lb 3.2 oz (133.4 kg)   SpO2 92%   BMI 39.90 kg/m²   Temp (24hrs), Av.9 °F (36.6 °C), Min:97.7 °F (36.5 °C), Max:98.1 °F (36.7 °C)    No results for input(s): POCGLU in the last 72 hours. I/O (24Hr): Intake/Output Summary (Last 24 hours) at 2021 1155  Last data filed at 2021 1055  Gross per 24 hour   Intake 925 ml   Output 675 ml   Net 250 ml       Labs:  Hematology:  Recent Labs     21  0540 21  0721  0846 21   WBC 2.7*  --  6.2 9.1   RBC 5.21  --  5.20 5.67   HGB 15.8  --  15.8 17.1*   HCT 46.8  --  47.1 49.9   MCV 89.8  --  90.6 88.0   MCH 30.3  --  30.4 30.2   MCHC 33.8  --  33.5 34.3   RDW 13.5  --  13.6 13.6   PLT 94*  --  141 170   MPV 12.9  --  12.3 11.8   CRP  --  24.2*  --   --      Chemistry:  Recent Labs     21  1434 21  0540 2117 21   NA  --  126* 129* 127*   K  --  3.5* 4.0 4.6   CL  --  88* 93* 89*   CO2  --  23 22 22   GLUCOSE  --  132* 95 142*   BUN  --  16 * *   CREATININE  --  0.85 0.91 0.90   MG  --  2.4  --   --    ANIONGAP  --  15 14 16   LABGLOM  --  >60 >60 >60   GFRAA  --  >60 >60 >60   CALCIUM  --  8.2* 8.2* 8.3*   TROPHS <6  --   --   --      Recent Labs     21  0540 21   PROT 6.7 7.0   LABALBU 3.2* 3.7   * 257*   * 188*   ALKPHOS 62 96   BILITOT 0.48 0.51     ABG:  Lab Results   Component Value Date    POCPH 7.503 2021    POCPCO2 32.0 2021    POCPO2 50.9 2021    POCHCO3 25.2 2021    NBEA NOT REPORTED 2021    PBEA 3 2021    BBB3BUT NOT REPORTED 2021    HIFP6LRF 89 2021    FIO2 70.0 2021     No results found for: SPECIAL  No results found for: CULTURE    Radiology:  XR CHEST (SINGLE VIEW FRONTAL)    Result Date: 11/3/2021  Scattered pulmonary opacities consistent with multifocal airspace disease.      XR CHEST PORTABLE    Result Date: 2021  Increased bilateral airspace opacities suggesting worsening COVID pneumonia XR CHEST PORTABLE    Result Date: 11/3/2021  No cardiomegaly or interstitial edema. Patchy bilateral perihilar and left lower lobe pneumonia like infiltrate was noted. Partial clearing has occurred bilaterally since 11/03/2021, 1157 hours. No pneumothorax. Physical Examination:        General appearance:  alert, cooperative and on high flow  Mental Status:  oriented to person, place and time and normal affect  Lungs:  cdistant breath sounds   Heart:  regular rate and rhythm, no murmur  Abdomen:  soft, nontender, nondistended, normal bowel sounds  Extremities:  no edema, redness, tenderness in the calves  Skin:  no gross lesions, rashes, induration    Assessment:        Hospital Problems           Last Modified POA    * (Principal) Acute respiratory failure due to COVID-19 ([address] 11/3/2021 Yes    Benign prostatic hyperplasia with lower urinary tract symptoms 11/3/2021 Yes    Obesity (BMI 35.0-39.9 without comorbidity) 11/3/2021 Yes    Acquired hypothyroidism 11/4/2021 Yes    Obstructive sleep apnea syndrome 11/4/2021 Yes          Plan:        1. Acute respiratory failure secondary to COVID-19.  high dose decadron Actemra 11/3/2021. Appreciate infectious disease pulmonary recommendations. 2. Discussed with aptient about incentive spirometry. Demonstrated good understanding. Is able to have 2L on incentive spirometry. Encouraged continued use. 10 times every hour  3. Hypothyroidism continue Synthroid  4. GERD continue Protonix  5. Hypertension Coreg, monitor blood pressure  6. Repeat lasix again 40 mg IV  7. BPH. Flomax  8.  PT/OT when tolerating    Radha Gillette MD  11/6/2021  11:55 AM

## 2021-11-06 NOTE — PLAN OF CARE
Problem: Airway Clearance - Ineffective  Goal: Achieve or maintain patent airway  11/6/2021 0505 by Kingston Devi RN  Outcome: Ongoing     Problem: Gas Exchange - Impaired  Goal: Absence of hypoxia  11/6/2021 0505 by Kingston Devi RN  Outcome: Ongoing     Problem: Gas Exchange - Impaired  Goal: Promote optimal lung function  11/6/2021 0505 by Kingston Devi RN  Outcome: Ongoing     Problem: Breathing Pattern - Ineffective  Goal: Ability to achieve and maintain a regular respiratory rate  11/6/2021 0505 by Kingston Devi RN  Outcome: Ongoing     Problem: Body Temperature -  Risk of, Imbalanced  Goal: Ability to maintain a body temperature within defined limits  Outcome: Ongoing     Problem: Body Temperature -  Risk of, Imbalanced  Goal: Will regain or maintain usual level of consciousness  Outcome: Ongoing     Problem:  Body Temperature -  Risk of, Imbalanced  Goal: Complications related to the disease process, condition or treatment will be avoided or minimized  Outcome: Ongoing     Problem: Nutrition Deficits  Goal: Optimize nutritional status  Outcome: Ongoing     Problem: Risk for Fluid Volume Deficit  Goal: Maintain absence of muscle cramping  Outcome: Ongoing     Problem: Loneliness or Risk for Loneliness  Goal: Demonstrate positive use of time alone when socialization is not possible  Outcome: Ongoing     Problem: Patient Education: Go to Patient Education Activity  Goal: Patient/Family Education  Outcome: Ongoing     Problem: Fatigue  Goal: Verbalize increase energy and improved vitality  Outcome: Ongoing     Problem: SKIN INTEGRITY  Goal: Skin integrity is maintained or improved  Outcome: Ongoing

## 2021-11-06 NOTE — PROGRESS NOTES
[facility]  Progress Note    Terry Borden  MRN: [id number]  Date: 2021  LOS: 3     Reason for F/U :   COVID-19 virus infection    Impression :   1. COVID-19 virus infection tested + 2021, 2021  2. Elevated inflammatory markers  3. Hyponatremia  4. Transaminitis  5. Thrombocytopenia  6. Essential hypertension  7. Obesity    Recommendations:   · The patient was started on Decadron 11/3/2021  · Actemra administered 11/3/2021  · The patient continues on high flow O2 by nasal cannula. · Continue supportive care    Infection Control Recommendations:   COVID-19 virus infection    Discharge Planning:   Patient will need Midline Catheter Insertion/ PICC line Insertion: No  Patient will need: Home IV , [place],  SNF,  LTAC: Undetermined  Patient willneed outpatient wound care: No    Medical Decision making / Summary of Stay:   Conni Cowden is a 60y.o.-year-old male who was initially admitted on 11/3/2021. Patient seen at the request of Dr. David Macias:     Patient, who stated he tested positive for Covid on 21, presented to the infusion center on 11/3/21 for the monoclonal antibody infusion. His symptoms started on 10/28/21. The RN noted his SpO2 was in the low 60s on room air. He was placed on nasal cannula and brought to the ED.     While in the ED, his oxygen did not improve with NC and he was put on non-re-breather and then BiPAP. His CRP was 86.2.     The patient was started on Decadron and administered Actemra. Current evaluation:2021    BP (!) 107/93   Pulse 69   Temp 97.9 °F (36.6 °C) (Oral)   Resp 20   Ht 6' (1.829 m)   Wt 294 lb 3.2 oz (133.4 kg)   SpO2 90%   BMI 39.90 kg/m²     Temperature Range: Temp: 97.9 °F (36.6 °C) Temp  Av.9 °F (36.6 °C)  Min: 97.7 °F (36.5 °C)  Max: 98.1 °F (36.7 °C)  The patient is seen and evaluated at bedside he is sitting up in the chair at 6 L and 80% FiO2 on high flow.   The patient does report a dry cough but no subjective fevers or chills. No chest pains or palpitations. Review of Systems   Constitutional: Negative. Respiratory: Positive for cough. Cardiovascular: Negative. Gastrointestinal: Negative. Genitourinary: Negative. Musculoskeletal: Negative. Allergic/Immunologic: Negative. Neurological: Negative. Physical Examination :     Physical Exam  Constitutional:       Appearance: He is well-developed. HENT:      Head: Normocephalic and atraumatic. Cardiovascular:      Rate and Rhythm: Normal rate. Heart sounds: Normal heart sounds. No friction rub. No gallop. Pulmonary:      Effort: Pulmonary effort is normal.      Breath sounds: Normal breath sounds. No wheezing. Abdominal:      General: Bowel sounds are normal.      Palpations: Abdomen is soft. There is no mass. Tenderness: There is no abdominal tenderness. Musculoskeletal:         General: Normal range of motion. Cervical back: Normal range of motion and neck supple. Lymphadenopathy:      Cervical: No cervical adenopathy. Skin:     General: Skin is warm and dry. Neurological:      Mental Status: He is alert and oriented to person, place, and time. Laboratory data:   I have independently reviewed the followinglabs:  CBC with Differential:   Recent Labs     11/05/21  0846 11/06/21 0448   WBC 6.2 9.1   HGB 15.8 17.1*   HCT 47.1 49.9    170   LYMPHOPCT 13* 4*   MONOPCT 3 5     BMP:   Recent Labs     11/04/21 0540 11/04/21  0540 11/05/21  0717 11/06/21 0448   *   < > 129* 127*   K 3.5*   < > 4.0 4.6   CL 88*   < > 93* 89*   CO2 23   < > 22 22   BUN 16   < > 21* 21*   CREATININE 0.85   < > 0.91 0.90   MG 2.4  --   --   --     < > = values in this interval not displayed.      Hepatic Function Panel:   Recent Labs     11/04/21  0540 11/06/21 0448   PROT 6.7 7.0   LABALBU 3.2* 3.7   BILITOT 0.48 0.51   ALKPHOS 62 96   * 188*   * 257*         Lab Results Component Value Date    PROCAL 1.43 11/03/2021     Lab Results   Component Value Date    CRP 24.2 11/05/2021    CRP 86.2 11/03/2021     No results found for: SEDRATE      Lab Results   Component Value Date    DDIMER 0.93 11/03/2021     Lab Results   Component Value Date    FERRITIN 2,936 11/03/2021     Lab Results   Component Value Date     11/03/2021     Lab Results   Component Value Date    FIBRINOGEN 538 11/03/2021       Results in Past 30 Days  Result Component Current Result Ref Range Previous Result Ref Range   SARS-CoV-2, Rapid DETECTED (A) (11/4/2021) Not Detected Not in Time Range      Lab Results   Component Value Date    COVID19 DETECTED 11/04/2021       No results for input(s): VANCOTROUGH in the last 72 hours. Imaging Studies:   ONE XRAY VIEW OF THE CHEST 11/6/2021 9:33 am    Impression   Patchy interstitial/airspace opacities again seen in the lungs, not   significantly changed from the prior study, compatible with COVID-19   pneumonia. Cultures:     COVID-19, Rapid [[id number]] (Abnormal) Collected: 11/04/21 1436   Order Status: Completed Specimen: Nasopharyngeal Swab Updated: 11/04/21 1501    Specimen Description . NASOPHARYNGEAL SWAB    SARS-CoV-2, Rapid DETECTED Abnormal     Comment:        Rapid NAAT: The specimen is POSITIVE for SARS-Cov-2, the novel coronavirus associated with   COVID-19.         This test has been authorized by the FDA under an Emergency Use Authorization (EUA) for use   by authorized laboratories.         The ID NOW COVID-19 assay is designed to detect the virus that causes COVID-19 in patients   with signs and symptoms of infection who are suspected of COVID-19. An individual without symptoms of COVID-19 and who is not shedding SARS-CoV-2 virus would   expect to have a negative (not detected) result in this assay.    Fact sheet for Healthcare Providers: Gloria   Fact sheet for Patients: Gloria           Methodology: Isothermal Nucleic Acid Amplification         Results reported to the appropriate Health Department             Medications:      sodium chloride flush  5-40 mL IntraVENous 2 times per day    enoxaparin  30 mg SubCUTAneous BID    dexamethasone  20 mg IntraVENous Q24H    carvedilol  6.25 mg Oral BID    pantoprazole  40 mg Oral QAM AC    levothyroxine  150 mcg Oral Daily    rosuvastatin  40 mg Oral Nightly    tamsulosin  0.4 mg Oral Daily           [facility]  Catalina Maurice MD  Perfect Serve messaging  OFFICE: [phone]      Electronically signed by Catalina Maurice MD on 11/6/2021 at 4:30 PM  Thank you for allowing us to participate in the care of this patient. Please call with questions. This note iscreated with the assistance of a speech recognition program.  While intending to generate a document that actually reflects the content of the visit, the document can still have some errors including those of syntax andsound a like substitutions which may escape proof reading. In such instances, actual meaning can be extrapolated by contextual diversion.

## 2021-11-06 NOTE — PLAN OF CARE
Problem: Airway Clearance - Ineffective  Goal: Achieve or maintain patent airway  11/5/2021 2118 by Marisa Antoine RCP  Outcome: Ongoing     Problem: Gas Exchange - Impaired  Goal: Absence of hypoxia  11/5/2021 2118 by Marisa Antoine RCP  Outcome: Ongoing     Problem: Gas Exchange - Impaired  Goal: Promote optimal lung function  11/5/2021 2118 by Marisa Antoine RCP  Outcome: Ongoing     Problem: Breathing Pattern - Ineffective  Goal: Ability to achieve and maintain a regular respiratory rate  11/5/2021 2118 by Marisa Antoine RCP  Outcome: Ongoing     PROVIDE ADEQUATE OXYGENATION WITH ACCEPTABLE SP02/ABG'S    [x]  IDENTIFY APPROPRIATE OXYGEN THERAPY  [x]   MONITOR SP02/ABG'S AS NEEDED   [x]   PATIENT EDUCATION AS NEEDED     NON INVASIVE VENTILATION    PROVIDE OPTIMAL VENTILATION/ACCEPTABLE SP02  IMPLEMENT NON INVASIVE VENTILATION PROTOCOL  ASSESSMENT SKIN INTEGRITY  PATIENT EDUCATION AS NEEDED  BIPAP AS NEEDED

## 2021-11-07 NOTE — PLAN OF CARE
Problem: Airway Clearance - Ineffective  Goal: Achieve or maintain patent airway  11/7/2021 1729 by Peter Flor RN  Outcome: Ongoing  11/7/2021 0517 by Abel Seip, RN  Outcome: Met This Shift     Problem: Gas Exchange - Impaired  Goal: Absence of hypoxia  11/7/2021 1729 by Peter Flor RN  Outcome: Ongoing  11/7/2021 0517 by Abel Seip, RN  Outcome: Met This Shift  Goal: Promote optimal lung function  11/7/2021 1729 by Peter Flor RN  Outcome: Ongoing  11/7/2021 0517 by Abel Seip, RN  Outcome: Ongoing     Problem: Breathing Pattern - Ineffective  Goal: Ability to achieve and maintain a regular respiratory rate  11/7/2021 1729 by Peter Flor RN  Outcome: Ongoing  11/7/2021 0517 by Abel Seip, RN  Outcome: Ongoing     Problem:  Body Temperature -  Risk of, Imbalanced  Goal: Ability to maintain a body temperature within defined limits  Outcome: Ongoing  Goal: Will regain or maintain usual level of consciousness  Outcome: Ongoing  Goal: Complications related to the disease process, condition or treatment will be avoided or minimized  Outcome: Ongoing     Problem: Isolation Precautions - Risk of Spread of Infection  Goal: Prevent transmission of infection  11/7/2021 1729 by Peter Flor RN  Outcome: Ongoing  11/7/2021 0517 by Abel Seip, RN  Outcome: Ongoing     Problem: Nutrition Deficits  Goal: Optimize nutritional status  Outcome: Ongoing     Problem: Risk for Fluid Volume Deficit  Goal: Maintain normal heart rhythm  11/7/2021 1729 by Peter Flor RN  Outcome: Ongoing  11/7/2021 0517 by Abel Seip, RN  Outcome: Ongoing  Goal: Maintain absence of muscle cramping  Outcome: Ongoing  Goal: Maintain normal serum potassium, sodium, calcium, phosphorus, and pH  Outcome: Ongoing     Problem: Loneliness or Risk for Loneliness  Goal: Demonstrate positive use of time alone when socialization is not possible  11/7/2021 1729 by Peter Flor RN  Outcome: Ongoing  11/7/2021 6552 chronic pain  Outcome: Ongoing

## 2021-11-07 NOTE — PROGRESS NOTES
[facility]  Progress Note    Charmaine John  MRN: [id number]  Date: 2021  LOS: 4     Reason for F/U :   COVID-19 virus infection    Impression :   1. COVID-19 virus infection tested + 2021, 2021  2. Elevated inflammatory markers  3. Hyponatremia  4. Transaminitis  5. Thrombocytopenia  6. Essential hypertension  7. Obesity    Recommendations:   · The patient was started on Decadron 11/3/2021  · Actemra administered 11/3/2021  · Patient is doing well clinically but continues to require significant oxygen support. · He remains on high flow at 60 L and 80%. · We will work on weaning his oxygen therapy. Infection Control Recommendations:   [id number]-[id number] virus infection    Discharge Planning:   Patient will need Midline Catheter Insertion/ PICC line Insertion: No  Patient will need: Home IV , [place],  SNF,  LTAC: Undetermined  Patient willneed outpatient wound care: No    Medical Decision making / Summary of Stay:   James Asp is a 60y.o.-year-old male who was initially admitted on 11/3/2021. Patient seen at the request of Dr. Trace Osorio:     Patient, who stated he tested positive for Covid on 21, presented to the infusion center on 11/3/21 for the monoclonal antibody infusion. His symptoms started on 10/28/21. The RN noted his SpO2 was in the low 60s on room air. He was placed on nasal cannula and brought to the ED.     While in the ED, his oxygen did not improve with NC and he was put on non-re-breather and then BiPAP. His CRP was 86.2.     The patient was started on Decadron and administered Actemra.     Current evaluation:2021    /73   Pulse 84   Temp 98.4 °F (36.9 °C) (Oral)   Resp 22   Ht 6' (1.829 m)   Wt 294 lb 3.2 oz (133.4 kg)   SpO2 94%   BMI 39.90 kg/m²     Temperature Range: Temp: 98.4 °F (36.9 °C) Temp  Av.1 °F (36.7 °C)  Min: 97.2 °F (36.2 °C)  Max: 98.4 °F (36.9 °C)  The patient is seen and evaluated at bedside he is sitting up in the chair at 60 L and 80% FiO2 on high flow. The patient reports actually feeling very good and is anxious about going home. No subjective fevers or chills. No cough or shortness of breath. No abdominal pain nausea vomiting or diarrhea. Review of Systems   Constitutional: Negative. Respiratory: Positive for cough. Cardiovascular: Negative. Gastrointestinal: Negative. Genitourinary: Negative. Musculoskeletal: Negative. Allergic/Immunologic: Negative. Neurological: Negative. Physical Examination :     Physical Exam  Constitutional:       Appearance: He is well-developed. HENT:      Head: Normocephalic and atraumatic. Cardiovascular:      Rate and Rhythm: Normal rate. Heart sounds: Normal heart sounds. No friction rub. No gallop. Pulmonary:      Effort: Pulmonary effort is normal.      Breath sounds: Normal breath sounds. No wheezing. Abdominal:      General: Bowel sounds are normal.      Palpations: Abdomen is soft. There is no mass. Tenderness: There is no abdominal tenderness. Musculoskeletal:         General: Normal range of motion. Cervical back: Normal range of motion and neck supple. Lymphadenopathy:      Cervical: No cervical adenopathy. Skin:     General: Skin is warm and dry. Neurological:      Mental Status: He is alert and oriented to person, place, and time.          Laboratory data:   I have independently reviewed the followinglabs:  CBC with Differential:   Recent Labs     11/06/21 0448 11/07/21 0454   WBC 9.1 10.2   HGB 17.1* 15.5   HCT 49.9 47.1    211   LYMPHOPCT 4* 8*   MONOPCT 5 2     BMP:   Recent Labs     11/06/21 0448 11/07/21 0454   * 124*   K 4.6 4.3   CL 89* 89*   CO2 22 22   BUN 21* 15   CREATININE 0.90 0.77     Hepatic Function Panel:   Recent Labs     11/06/21 0448 11/07/21 0454   PROT 7.0 6.4   LABALBU 3.7 3.5   BILITOT 0.51 0.61   ALKPHOS 96 94   * 238*   * 237*         Lab Results   Component Value Date    PROCAL 1.43 11/03/2021     Lab Results   Component Value Date    CRP 24.2 11/05/2021    CRP 86.2 11/03/2021     No results found for: SEDRATE      Lab Results   Component Value Date    DDIMER 0.93 11/03/2021     Lab Results   Component Value Date    FERRITIN 2,936 11/03/2021     Lab Results   Component Value Date     11/03/2021     Lab Results   Component Value Date    FIBRINOGEN 538 11/03/2021       Results in Past 30 Days  Result Component Current Result Ref Range Previous Result Ref Range   SARS-CoV-2, Rapid DETECTED (A) (11/4/2021) Not Detected Not in Time Range      Lab Results   Component Value Date    COVID19 DETECTED 11/04/2021       No results for input(s): VANCOTROUGH in the last 72 hours. Imaging Studies:   ONE XRAY VIEW OF THE CHEST 11/6/2021 9:33 am    Impression   Patchy interstitial/airspace opacities again seen in the lungs, not   significantly changed from the prior study, compatible with COVID-19   pneumonia. Cultures:     COVID-19, Rapid [[id number]] (Abnormal) Collected: 11/04/21 1436   Order Status: Completed Specimen: Nasopharyngeal Swab Updated: 11/04/21 1501    Specimen Description . NASOPHARYNGEAL SWAB    SARS-CoV-2, Rapid DETECTED Abnormal     Comment:        Rapid NAAT: The specimen is POSITIVE for SARS-Cov-2, the novel coronavirus associated with   COVID-19.         This test has been authorized by the FDA under an Emergency Use Authorization (EUA) for use   by authorized laboratories.         The ID NOW COVID-19 assay is designed to detect the virus that causes COVID-19 in patients   with signs and symptoms of infection who are suspected of COVID-19. An individual without symptoms of COVID-19 and who is not shedding SARS-CoV-2 virus would   expect to have a negative (not detected) result in this assay.    Fact sheet for Healthcare Providers: Gloria   Fact sheet for Patients: Gloria           Methodology: Isothermal Nucleic Acid Amplification         Results reported to the appropriate Health Department             Medications:      sodium chloride flush  5-40 mL IntraVENous 2 times per day    enoxaparin  30 mg SubCUTAneous BID    dexamethasone  20 mg IntraVENous Q24H    carvedilol  6.25 mg Oral BID    pantoprazole  40 mg Oral QAM AC    levothyroxine  150 mcg Oral Daily    rosuvastatin  40 mg Oral Nightly    tamsulosin  0.4 mg Oral Daily           [facility]  Kenisha Pinto MD  [employer] messaging  OFFICE: [phone]      Electronically signed by Kenisha Pinto MD on 11/7/2021 at 10:13 AM  Thank you for allowing us to participate in the care of this patient. Please call with questions. This note iscreated with the assistance of a speech recognition program.  While intending to generate a document that actually reflects the content of the visit, the document can still have some errors including those of syntax andsound a like substitutions which may escape proof reading. In such instances, actual meaning can be extrapolated by contextual diversion.

## 2021-11-07 NOTE — PROGRESS NOTES
[facility]  Office: [address], DO, Beverley Jump, DO, Denzel Dry, DO, Mary Gupta Blood, DO, Mandi Koo MD, Joy Rivera MD, Montana Olivarez MD, Jayesh Ayala MD, Stevie Aguillon MD, Adilia Urrutia MD, Neftali eNgrete MD, Arianna Sun MD, Claudell Orion, DO, Vanessa Robles, DO, Divine Walsh MD,  Peg Verdugo DO, Diane Goncalves MD, Sharon Ford MD, Chris Andres MD, Dale Garza MD, Juliet Mauricio MD, Ian Sarah MD, Jessica Cruz, [facility], [facility], CNP, Valdez Mobley, CNP, Avel Garza, CNS, Sharon Larios, CNP, Fanny Valenzuela, CNP, Devonte Hunt, CNP, Nikunj Cotton, CNP, Celina Woodward, CNP, Zully Fraga, CNP, Sophie Tadeo PA-C, Demetria Simons, [facility], Mine Mcqueen, CNP, Divya Jain, CNP, Harrison Foreman, CNP, Berlinda Boast, CNP, Rosaline Maldonado, CNP, Suzanne Whitman, CNP, Denisa Simpson, [address] [place] [address]    Progress Note    11/7/2021    12:12 PM    Name:   Terry Borden  MRN:     [id number]     Acct:      [de-identified]   Room:   [address] [place] [address] Day:  4  Admit Date:  11/3/2021 10:59 AM    PCP:   Isidro Rudolph DO  Code Status:  Full Code    Subjective:     C/C:   Chief Complaint   Patient presents with    Positive For Covid-19     went to infusion center for covid +, stumbling in with wife, RN checked his oxygen and it was 62s, brought over to ed via wheelchair on 4L via NC, increased to 6L with no change and oxygen in 70s, now on NRB. Interval History Status: not changed. huongeitn seen and examinedsititng in chair. Cough better. Has been using his incenetive spirometry actively. Still requiring 80% on high flow. Brief History:     Conni Cowden is a 60 y.o.  Non- / non  male who presents with Positive For Covid-19 (went to infusion center for covid +, stumbling in with wife, RN checked his oxygen and it was 62s, brought over to ed via wheelchair on 4L via NC, increased to 6L with (1.829 m)   Wt 294 lb 3.2 oz (133.4 kg)   SpO2 98%   BMI 39.90 kg/m²   Temp (24hrs), Av.1 °F (36.7 °C), Min:97.2 °F (36.2 °C), Max:98.4 °F (36.9 °C)    No results for input(s): POCGLU in the last 72 hours. I/O (24Hr): Intake/Output Summary (Last 24 hours) at 2021 1212  Last data filed at 2021 0610  Gross per 24 hour   Intake 730 ml   Output 3550 ml   Net -2820 ml       Labs:  Hematology:  Recent Labs     21  0721  0846 21  0454   WBC  --  6.2 9.1 10.2   RBC  --  5.20 5.67 5.20   HGB  --  15.8 17.1* 15.5   HCT  --  47.1 49.9 47.1   MCV  --  90.6 88.0 90.6   MCH  --  30.4 30.2 29.8   MCHC  --  33.5 34.3 32.9   RDW  --  13.6 13.6 13.6   PLT  --  141 170 211   MPV  --  12.3 11.8 11.4   CRP 24.2*  --   --   --      Chemistry:  Recent Labs     21  045   * 127* 124*   K 4.0 4.6 4.3   CL 93* 89* 89*   CO2 22 22 22   GLUCOSE 95 142* 135*   BUN 21* 21* 15   CREATININE 0.91 0.90 0.77   ANIONGAP 14 16 13   LABGLOM >60 >60 >60   GFRAA >60 >60 >60   CALCIUM 8.2* 8.3* 8.3*     Recent Labs     21  0454   PROT 7.0 6.4   LABALBU 3.7 3.5   * 237*   * 238*   ALKPHOS 96 94   BILITOT 0.51 0.61     ABG:  Lab Results   Component Value Date    POCPH 7.503 2021    POCPCO2 32.0 2021    POCPO2 50.9 2021    POCHCO3 25.2 2021    NBEA NOT REPORTED 2021    PBEA 3 2021    PVX9OEX NOT REPORTED 2021    LOYI4LXT 89 2021    FIO2 70.0 2021     No results found for: SPECIAL  No results found for: CULTURE    Radiology:  XR CHEST (SINGLE VIEW FRONTAL)    Result Date: 11/3/2021  Scattered pulmonary opacities consistent with multifocal airspace disease. XR CHEST PORTABLE    Result Date: 2021  Increased bilateral airspace opacities suggesting worsening COVID pneumonia     XR CHEST PORTABLE    Result Date: 11/3/2021  No cardiomegaly or interstitial edema.  Patchy bilateral perihilar and left lower lobe pneumonia like infiltrate was noted. Partial clearing has occurred bilaterally since 11/03/2021, 1157 hours. No pneumothorax. Physical Examination:        General appearance:  alert, cooperative and on high flow  Mental Status:  oriented to person, place and time and normal affect  Lungs:  cdistant breath sounds   Heart:  regular rate and rhythm, no murmur  Abdomen:  soft, nontender, nondistended, normal bowel sounds  Extremities:  no edema, redness, tenderness in the calves  Skin:  no gross lesions, rashes, induration    Assessment:        Hospital Problems           Last Modified POA    * (Principal) Acute respiratory failure due to COVID-19 ([facility] [address] 11/3/2021 Yes    Benign prostatic hyperplasia with lower urinary tract symptoms 11/3/2021 Yes    Obesity (BMI 35.0-39.9 without comorbidity) 11/3/2021 Yes    Acquired hypothyroidism 11/4/2021 Yes    Obstructive sleep apnea syndrome 11/4/2021 Yes          Plan:        1. Acute respiratory failure secondary to COVID-19.  high dose decadron Actemra 11/3/2021. Appreciate infectious disease pulmonary recommendations. 2. Continue incentive spirometry  3. Hypothyroidism continue Synthroid  4. GERD continue Protonix  5. Hypertension Coreg, monitor blood pressure  6. BPH. Flomax  7.  PT/OT when tolerating    Yan Corcoran MD  11/7/2021  12:12 PM

## 2021-11-07 NOTE — PROGRESS NOTES
PULMONARY & CRITICAL CARE MEDICINE PROGRESS NOTE     Patient:  Mónica Day  MRN: [id number]  Admit date: 11/3/2021  Primary Care Physician: Gigi Villatoro DO  Consulting Physician: Carlos Jimenez MD  CODE Status: Full Code  LOS: 4     SUBJECTIVE     CHIEF COMPLAINT/REASON FOR INITIAL CONSULT:   Acute respiratory failure/COVID-19 pneumonia    BRIEF HOSPITAL COURSE:   The patient is a 60 y.o. male history of obesity, diabetes, hypertension, hyperlipidemia was recently diagnosed to have COVID-19. He went to the infusion center for antibody cocktail. However he was found to be hypoxemic with oxygen saturation in 60s. He was sent to the ER and hospitalized for COVID-19 pneumonia. He was initially on nasal cannula. Subsequently needed initiation of BiPAP. At the time of my evaluation patient was on high flow nasal cannula. Sitting comfortably in the bed. He denies any significant dyspnea. Has productive cough with sputum. He denies any history of sleep apnea. INTERVAL HISTORY:  11/07/21  No acute event overnight. Conversational dyspnea. Remains on high flow oxygen with an FiO2 of 80%. Not febrile. Paroxysms of cough. Laboratory studies reviewed. Fluid balance negative. Receiving high-dose Decadron s/p actemra. Blood sugars mildly elevated. REVIEW OF SYSTEMS:  Review of Systems   Constitutional: Positive for appetite change and fatigue. Negative for fever. HENT: Negative for postnasal drip, sinus pain, sore throat, trouble swallowing and voice change. Eyes: Negative for photophobia, redness and visual disturbance. Respiratory: Positive for cough and shortness of breath. Negative for wheezing. Cardiovascular: Negative for chest pain, palpitations and leg swelling. Gastrointestinal: Negative for abdominal pain, blood in stool, diarrhea and vomiting. Endocrine: Negative for polydipsia, polyphagia and polyuria. Genitourinary: Negative for dysuria, frequency and hematuria. Musculoskeletal: Negative. Allergic/Immunologic: Negative. Neurological: Negative for dizziness, syncope, speech difficulty and headaches. Hematological: Negative for adenopathy. Does not bruise/bleed easily. Psychiatric/Behavioral: Negative. OBJECTIVE     PaO2/FiO2 RATIO:  No results for input(s): POCPO2 in the last 72 hours. FiO2 : 80 %     VITAL SIGNS:   LAST:  /73   Pulse 84   Temp 98.4 °F (36.9 °C) (Oral)   Resp 22   Ht 6' (1.829 m)   Wt 294 lb 3.2 oz (133.4 kg)   SpO2 94%   BMI 39.90 kg/m²   8-24 HR RANGE:  TEMP Temp  Av.1 °F (36.7 °C)  Min: 97.2 °F (36.2 °C)  Max: 98.4 °F (59.1 °C)   BP Systolic (55DMY), DVZ:233 , Min:121 , NUE:365      Diastolic (56CHC), KGI:39, Min:73, Max:95     PULSE Pulse  Av.1  Min: 69  Max: 84   RR Resp  Av.8  Min: 20  Max: 27   O2 SAT SpO2  Av %  Min: 90 %  Max: 94 %   OXYGEN DELIVERY O2 Flow Rate (L/min)  Av L/min  Min: 60 L/min  Max: 60 L/min        SYSTEMIC EXAMINATION:   General appearance - Ill-appearing, mild to moderate respiratory distress  Mental status - awake & alert, follows commands  Eyes - pupils equal and reactive, sclera anicteric  Mouth - mucous membranes moist, pharynx normal without lesions. Large tongue  Neck - supple, no significant adenopathy, carotids upstroke normal bilaterally, no bruits  Chest - Breath sounds bilaterally were dimnished to auscultation at bases. There were mild crackles present at bases, no wheezes, rhonchi.   There is no intercostal recession or use of accessory muscles  Heart - normal rate, regular rhythm, normal S1, S2, no murmurs, rubs, clicks or gallops  Abdomen - soft, nontender, nondistended, no masses or organomegaly  Neurological - non-focal.  Cranial nerves intact no gross motor neuro deficit  Extremities - peripheral pulses normal, no pedal edema, no clubbing or cyanosis  Skin - normal coloration and turgor, no rashes, no suspicious skin lesions noted     DATA REVIEW Medications: Current Inpatient  Scheduled Meds:   sodium chloride flush  5-40 mL IntraVENous 2 times per day    enoxaparin  30 mg SubCUTAneous BID    dexamethasone  20 mg IntraVENous Q24H    carvedilol  6.25 mg Oral BID    pantoprazole  40 mg Oral QAM AC    levothyroxine  150 mcg Oral Daily    rosuvastatin  40 mg Oral Nightly    tamsulosin  0.4 mg Oral Daily     Continuous Infusions:   sodium chloride         INPUT/OUTPUT:  In: 730 [P.O.:730]  Out: 2500 [Urine:2500]  Date 11/07/21 0000 - 11/07/21 2359   Shift 8414-5125 2263-2026 9772-6087 24 Hour Total   INTAKE   Shift Total(mL/kg)       OUTPUT   Urine(mL/kg/hr) 1400(1.3)   1400   Shift Total(mL/kg) 1400(10.5)   1400(10.5)   Weight (kg) 133.4 133.4 133.4 133.4        LABS:  ABGs:   No results for input(s): POCPH, POCPCO2, POCPO2, POCHCO3, XYAB7DPY in the last 72 hours. CBC:   Recent Labs     11/05/21  0846 11/06/21 0448 11/07/21 0454   WBC 6.2 9.1 10.2   HGB 15.8 17.1* 15.5   HCT 47.1 49.9 47.1   MCV 90.6 88.0 90.6    170 211   LYMPHOPCT 13* 4* 8*   RBC 5.20 5.67 5.20   MCH 30.4 30.2 29.8   MCHC 33.5 34.3 32.9   RDW 13.6 13.6 13.6     CRP:   Recent Labs     11/05/21 0717   CRP 24.2*     LDH:   No results for input(s): LDH in the last 72 hours. BMP:   Recent Labs     11/05/21 0717 11/06/21 0448 11/07/21 0454   * 127* 124*   K 4.0 4.6 4.3   CL 93* 89* 89*   CO2 22 22 22   BUN 21* 21* 15   CREATININE 0.91 0.90 0.77   GLUCOSE 95 142* 135*     Liver Function Test:   Recent Labs     11/06/21 0448 11/07/21 0454   PROT 7.0 6.4   LABALBU 3.7 3.5   * 238*   * 237*   ALKPHOS 96 94   BILITOT 0.51 0.61     Coagulation Profile:   No results for input(s): INR, PROTIME, APTT in the last 72 hours. D-Dimer:  No results for input(s): DDIMER in the last 72 hours. Ferritin:    No results for input(s): FERRITIN in the last 72 hours. Lactic Acid:  No results for input(s): LACTA in the last 72 hours.   Cardiac Enzymes:  No results for input(s): CKTOTAL, CKMB, CKMBINDEX, TROPONINI in the last 72 hours. Invalid input(s): TROPONIN, HSTROP  BNP/ProBNP:   No results for input(s): BNP, PROBNP in the last 72 hours. Triglycerides:  No results for input(s): TRIG in the last 72 hours. Microbiology:  Urine Culture:  No components found for: CURINE  Blood Culture:  No components found for: CBLOOD, CFUNGUSBL  Sputum Culture:  No components found for: CSPUTUM  Recent Labs     11/04/21  1436   [facility] . NASOPHARYNGEAL SWAB     Recent Labs     11/04/21  1436   SPECDESC . NASOPHARYNGEAL SWAB        Pathology:    Radiology Reports:  XR CHEST PORTABLE   Preliminary Result   Patchy interstitial/airspace opacities again seen in the lungs, not   significantly changed from the prior study, compatible with COVID-19   pneumonia. XR CHEST PORTABLE   Final Result   Increased bilateral airspace opacities suggesting worsening COVID pneumonia         XR CHEST PORTABLE   Final Result   No cardiomegaly or interstitial edema. Patchy bilateral perihilar and left lower lobe pneumonia like infiltrate was   noted. Partial clearing has occurred bilaterally since 11/03/2021, 1157   hours. No pneumothorax. XR CHEST (SINGLE VIEW FRONTAL)   Final Result   Scattered pulmonary opacities consistent with multifocal airspace disease. Echocardiogram:   No results found for this or any previous visit. ASSESSMENT AND PLAN     Assessment:    // Acute hypoxic respiratory failure  // Acute respiratory distress syndrome  // Bilateral multifocal pneumonia due to COVID 19 infection  // Sepsis due to COVID 19  // Probable obstructive sleep apnea  // Hypertension  // Diabetes mellitus  // Morbid obesity    Plan:  1. Continue to support oxygen. Either high flow or BiPAP. 2. Watch for signs of respiratory muscle fatigue. 3. Encourage up out of bed. 4. Continue steroids. 5. Monitor I's/O.  6. Encourage vaccination when recovers.     The patient is/remains critically ill with illness/injury that acutely impairs one or more vital organ systems, such that there is a high probability of imminent or life threatening deterioration in the patient's condition. Critical care time of 30 minutes was spent (excluding procedures), in coordination of care during bedside rounds and discussion of patient care in detail, and recommendations of the team were adopted in the plan. Necessity of all invasive devices was also confirmed. Alonso Farrar MD   Pulmonary and Critical Care Medicine           11/7/2021, 9:46 AM    This patient was evaluated in the context of the global SARS-CoV-2 (COVID-19) pandemic, which necessitated considerations that the patient either has COVID-19 infection or is at risk of infection with COVID-19. Institutional protocols and algorithms that pertain to the evaluation & management of patients with COVID-19 or those at risk for COVID-19 are in a state of rapid changes based on information released by regulatory bodies including the CDC and federal and state organizations. These policies and algorithms were followed during the patient's care. Please note that this chart was generated using voice recognition Dragon dictation software. Although every effort was made to ensure the accuracy of this automated transcription, some errors in transcription may have occurred.

## 2021-11-07 NOTE — PROGRESS NOTES
Allergic/Immunologic: Negative. Neurological: Negative for dizziness, syncope, speech difficulty and headaches. Hematological: Negative for adenopathy. Does not bruise/bleed easily. Psychiatric/Behavioral: Negative. OBJECTIVE     PaO2/FiO2 RATIO:  No results for input(s): POCPO2 in the last 72 hours. FiO2 : 80 %     VITAL SIGNS:   LAST:  BP (!) 145/88   Pulse 72   Temp 97.2 °F (36.2 °C)   Resp 20   Ht 6' (1.829 m)   Wt 294 lb 3.2 oz (133.4 kg)   SpO2 91%   BMI 39.90 kg/m²   8-24 HR RANGE:  TEMP Temp  Av.9 °F (36.6 °C)  Min: 97.2 °F (36.2 °C)  Max: 98.4 °F (96.5 °C)   BP Systolic (37BQV), PIQ:735 , Min:96 , VSK:447      Diastolic (01OAN), ITT:02, Min:47, Max:93     PULSE Pulse  Av.5  Min: 69  Max: 87   RR Resp  Av  Min: 17  Max: 23   O2 SAT SpO2  Av.4 %  Min: 90 %  Max: 93 %   OXYGEN DELIVERY O2 Flow Rate (L/min)  Av L/min  Min: 60 L/min  Max: 60 L/min        SYSTEMIC EXAMINATION:   General appearance - Ill-appearing, mild to moderate respiratory distress  Mental status - awake & alert, follows commands  Eyes - pupils equal and reactive, sclera anicteric  Mouth - mucous membranes moist, pharynx normal without lesions. Large tongue  Neck - supple, no significant adenopathy, carotids upstroke normal bilaterally, no bruits  Chest - Breath sounds bilaterally were dimnished to auscultation at bases. There were mild crackles present at bases, no wheezes, rhonchi.   There is no intercostal recession or use of accessory muscles  Heart - normal rate, regular rhythm, normal S1, S2, no murmurs, rubs, clicks or gallops  Abdomen - soft, nontender, nondistended, no masses or organomegaly  Neurological - non-focal.  Cranial nerves intact no gross motor neuro deficit  Extremities - peripheral pulses normal, no pedal edema, no clubbing or cyanosis  Skin - normal coloration and turgor, no rashes, no suspicious skin lesions noted     DATA REVIEW     Medications: Current Inpatient  Scheduled Meds:   sodium chloride flush  5-40 mL IntraVENous 2 times per day    enoxaparin  30 mg SubCUTAneous BID    dexamethasone  20 mg IntraVENous Q24H    carvedilol  6.25 mg Oral BID    pantoprazole  40 mg Oral QAM AC    levothyroxine  150 mcg Oral Daily    rosuvastatin  40 mg Oral Nightly    tamsulosin  0.4 mg Oral Daily     Continuous Infusions:   sodium chloride         INPUT/OUTPUT:  In: 0069 [P.O.:1255]  Out: 6315 [Urine:1725]  Date 11/06/21 0000 - 11/06/21 2359   Shift 6807-8530 0756-8766 6171-2858 24 Hour Total   INTAKE   P.O.(mL/kg/hr)  525(0.5) 730 1255   Shift Total(mL/kg)  525(3.9) 730(5.5) 1255(9.4)   OUTPUT   Urine(mL/kg/hr)  1425(1.3) 300 1725   Shift Total(mL/kg)  1425(10.7) 300(2.2) 1725(12.9)   Weight (kg) 133.4 133.4 133.4 133.4        LABS:  ABGs:   No results for input(s): POCPH, POCPCO2, POCPO2, POCHCO3, VSWA7CCC in the last 72 hours. CBC:   Recent Labs     11/04/21 0540 11/05/21  0846 11/06/21  0448   WBC 2.7* 6.2 9.1   HGB 15.8 15.8 17.1*   HCT 46.8 47.1 49.9   MCV 89.8 90.6 88.0   PLT 94* 141 170   LYMPHOPCT 25 13* 4*   RBC 5.21 5.20 5.67   MCH 30.3 30.4 30.2   MCHC 33.8 33.5 34.3   RDW 13.5 13.6 13.6     CRP:   Recent Labs     11/05/21  0717   CRP 24.2*     LDH:   No results for input(s): LDH in the last 72 hours. BMP:   Recent Labs     11/04/21 0540 11/05/21 0717 11/06/21  0448   * 129* 127*   K 3.5* 4.0 4.6   CL 88* 93* 89*   CO2 23 22 22   BUN 16 21* 21*   CREATININE 0.85 0.91 0.90   GLUCOSE 132* 95 142*     Liver Function Test:   Recent Labs     11/04/21  0540 11/06/21  0448   PROT 6.7 7.0   LABALBU 3.2* 3.7   * 188*   * 257*   ALKPHOS 62 96   BILITOT 0.48 0.51     Coagulation Profile:   No results for input(s): INR, PROTIME, APTT in the last 72 hours. D-Dimer:  No results for input(s): DDIMER in the last 72 hours. Ferritin:    No results for input(s): FERRITIN in the last 72 hours.   Lactic Acid:  No results for input(s): LACTA in the last 72 hours. Cardiac Enzymes:  No results for input(s): CKTOTAL, CKMB, CKMBINDEX, TROPONINI in the last 72 hours. Invalid input(s): TROPONIN, HSTROP  BNP/ProBNP:   No results for input(s): BNP, PROBNP in the last 72 hours. Triglycerides:  No results for input(s): TRIG in the last 72 hours. Microbiology:  Urine Culture:  No components found for: CURINE  Blood Culture:  No components found for: CBLOOD, CFUNGUSBL  Sputum Culture:  No components found for: CSPUTUM  Recent Labs     11/04/21  1436   [facility] . NASOPHARYNGEAL SWAB     Recent Labs     11/04/21  1436   [facility] . NASOPHARYNGEAL SWAB        Pathology:    Radiology Reports:  XR CHEST PORTABLE   Preliminary Result   Patchy interstitial/airspace opacities again seen in the lungs, not   significantly changed from the prior study, compatible with COVID-19   pneumonia. XR CHEST PORTABLE   Final Result   Increased bilateral airspace opacities suggesting worsening COVID pneumonia         XR CHEST PORTABLE   Final Result   No cardiomegaly or interstitial edema. Patchy bilateral perihilar and left lower lobe pneumonia like infiltrate was   noted. Partial clearing has occurred bilaterally since 11/03/2021, 1157   hours. No pneumothorax. XR CHEST (SINGLE VIEW FRONTAL)   Final Result   Scattered pulmonary opacities consistent with multifocal airspace disease. Echocardiogram:   No results found for this or any previous visit. ASSESSMENT AND PLAN     Assessment:    // Acute hypoxic respiratory failure  // Acute respiratory distress syndrome  // Bilateral multifocal pneumonia due to COVID 19 infection  // Sepsis due to COVID 19  // Probable obstructive sleep apnea  // Hypertension  // Diabetes mellitus  // Morbid obesity    Plan:  1. Continue to support oxygen. Either high flow or BiPAP. 2. Watch for signs of respiratory muscle fatigue. 3. Encourage up out of bed. 4. Continue steroids.   5. Monitor I's/O.  6. Encourage vaccination when recovers. The patient is/remains critically ill with illness/injury that acutely impairs one or more vital organ systems, such that there is a high probability of imminent or life threatening deterioration in the patient's condition. Critical care time of 30 minutes was spent (excluding procedures), in coordination of care during bedside rounds and discussion of patient care in detail, and recommendations of the team were adopted in the plan. Necessity of all invasive devices was also confirmed. Sarkis Escalante DO   Pulmonary and Critical Care Medicine           11/6/2021, 8:19 PM    This patient was evaluated in the context of the global SARS-CoV-2 (COVID-19) pandemic, which necessitated considerations that the patient either has COVID-19 infection or is at risk of infection with COVID-19. Institutional protocols and algorithms that pertain to the evaluation & management of patients with COVID-19 or those at risk for COVID-19 are in a state of rapid changes based on information released by regulatory bodies including the CDC and federal and state organizations. These policies and algorithms were followed during the patient's care. Please note that this chart was generated using voice recognition Dragon dictation software. Although every effort was made to ensure the accuracy of this automated transcription, some errors in transcription may have occurred.

## 2021-11-07 NOTE — PLAN OF CARE
Problem: Airway Clearance - Ineffective  Goal: Achieve or maintain patent airway  11/7/2021 0517 by Amanda Maya RN  Outcome: Met This Shift     Problem: Gas Exchange - Impaired  Goal: Absence of hypoxia  11/7/2021 0517 by Amanda Maya RN  Outcome: Met This Shift     Problem: SAFETY  Goal: Free from accidental physical injury  Outcome: Met This Shift     Problem: SAFETY  Goal: Free from intentional harm  Outcome: Met This Shift     Problem: PAIN  Goal: Patient's pain/discomfort is manageable  Outcome: Met This Shift     Problem: Pain:  Goal: Pain level will decrease  Description: Pain level will decrease  Outcome: Met This Shift     Problem: Pain:  Goal: Control of acute pain  Description: Control of acute pain  Outcome: Met This Shift     Problem: Gas Exchange - Impaired  Goal: Promote optimal lung function  11/7/2021 0517 by Amanda Maya RN  Outcome: Ongoing     Problem: Breathing Pattern - Ineffective  Goal: Ability to achieve and maintain a regular respiratory rate  11/7/2021 0517 by Amanda Maya RN  Outcome: Ongoing     Problem: Isolation Precautions - Risk of Spread of Infection  Goal: Prevent transmission of infection  Outcome: Ongoing     Problem: Risk for Fluid Volume Deficit  Goal: Maintain normal heart rhythm  Outcome: Ongoing     Problem: Loneliness or Risk for Loneliness  Goal: Demonstrate positive use of time alone when socialization is not possible  Outcome: Ongoing

## 2021-11-08 NOTE — PLAN OF CARE
Problem: Airway Clearance - Ineffective  Goal: Achieve or maintain patent airway  11/7/2021 2051 by Marisa Antoine RCP  Outcome: Ongoing     Problem: Gas Exchange - Impaired  Goal: Absence of hypoxia  11/7/2021 2051 by Marisa Antoine RCP  Outcome: Ongoing     Problem: Gas Exchange - Impaired  Goal: Promote optimal lung function  11/7/2021 2051 by Marisa Antoine RCP  Outcome: Ongoing     Problem: Breathing Pattern - Ineffective  Goal: Ability to achieve and maintain a regular respiratory rate  11/7/2021 2051 by Marisa Antoine RCP  Outcome: Ongoing

## 2021-11-08 NOTE — PROGRESS NOTES
[facility] [address] [facility] Patient  Today's Date and Time: 11/8/2021, 9:49 AM    Impression :   · COVID 19 Suspect  · COVID 19 Confirmed Infection  · Covid tests:  · 11/1/21: Positive  · 11/4/21: Positive  · Hyponatremia  · Elevated inflammatory markers  · Thrombocytopenia  · Transaminitis  · Essential HTN  · BPH  · Obesity  Recommendations:   · Antibiotic treatment:  · Monitor off antibiotics  · Covid Rx:  · Clinical Research will approach patient to explore if he qualifies for any of the COVID 19 treatment protocols. · Remdesivir-contraindicated because of transaminitis  · Decadron-initiated 11/3/21  · Actemra-Ordered per primary on 11/3/21    Medical Decision Making/Summary/Discussion:11/8/2021     · Patient admitted with suspected COVID 19 infection  · Patient states he tested positive on 11/1/21-Rapid test ordered 11/4/21  · Patient may come out of isolation on 11/22/21    Infection Control Recommendations   · Universal Precautions  · Airborne isolation  · Droplet Isolation    Antimicrobial Stewardship Recommendations     · Discontinuation of therapy  Coordination of Outpatient Care:   · Estimated Length of IV antimicrobials:TBD  · Patient will need Midline Catheter Insertion: TBD  · Patient will need PICC line Insertion: No  · Patient will need: Home IV , [place],  SNF,  LTAC:TBD  · Patient will need outpatient wound care:No    Chief complaint/reason for consultation:   · Concern for COVID infection      History of Present Illness:   Cristela Duverney is a 60y.o.-year-old male who was initially admitted on 11/3/2021. Patient seen at the request of Dr. Bhanu Laureano:    Patient, who stated he tested positive for Covid on 11/1/21, presented to the infusion center on 11/3/21 for the monoclonal antibody infusion. His symptoms started on 10/28/21. The RN noted his SpO2 was in the low 60s on room air.  He was placed on nasal cannula and brought to the ED.    While in the ED, his oxygen did not improve with NC and he was put on non-re-breather and then BiPAP. His CRP was 86.2. The patient was started on Decadron and administered Actemra. CXR 11/3/21  Patchy bilateral perihilar and left lower lobe pneumonia like infiltrate      Patient admitted because of concerns with COVID 19.    CURRENT EVALUATION : 11/8/2021    Afebrile  VS stable    The patient continues on hi-flow NC  He is alert and oriented. He received Actemra on 11/3/21    Patient exhibiting respiratory distress. yes  Respiratory secretions:     Patient receiving supplemental oxygen. Hi-flow NC  RR: 16-->23-->24  02 sat: 95-->93-->93    % FIO2: 90-->95  Flow rate: 60 L/min  PEEP:      QTc:       NEWS Score: 0-4 Low risk group; 5-6: Medium risk group; 7 or above: High risk group  Parameters 3 2 1 0 1 2 3   Age    < 65   ? 65   RR ? 8  9-11 12-20  21-24 ? 25   O2 Sats ? 91 92-93 94-95 ? 96      Suppl O2  Yes  No      SBP ? 90  101-110 111-219   ? 220   HR ? 40  41-50 51-90  111-130 ? 131   Consciousness    Alert   Drowsiness, lethargy, or confusion   Temperature ? 35.0 C (95.0 F)  35.1-36.0 C 95.1-96.9 F 36.1-38.0 C 97.0-100.4 F 38.1-39.0 C 100.5-102.3 F ? 39.1 C ? 102.4 F      NEWS Score:   11/4/21: 3 Low risk    Overall Daily Picture:      Unchanged    Presence of secondary bacterial Infection:    No   Additional antibiotics: No    Labs, X rays reviewed: 11/8/2021    Na: 126-->129-->127  BUN:16-->21-->18  Cr:0.85-->0.91-->0.86    WBC:2.7-->6.2-->10.2-->13.4  Hb:15.8-->15.6  Plat: 94-->141-->253    Absolute Neutrophils:1.72  Absolute Lymphocytes:0.68  Neutrophil/Lymphocyte Ratio: 2.5 low risk    CRP:86.2  Ferritin:2936  LDH: 794    Pro Calcitonin:      Cultures:  Urine:  ·   Blood:  ·   Sputum :  ·   Wound:       CXR:     11/7/21    11/3/21    CAT:      Discussed with patient, RN, CC, IM.     I have personally reviewed the past medical history, past surgical history, medications, social history, and family history, and I have updated the database accordingly. Past Medical History:     Past Medical History:   Diagnosis Date    Diabetes mellitus ([facility] [address]     Hyperlipidemia     Hypertension     Kidney stone     Thyroid disease        Past Surgical  History:     Past Surgical History:   Procedure Laterality Date    HERNIA REPAIR      SHOULDER ARTHROPLASTY         Medications:      sodium chloride flush  5-40 mL IntraVENous 2 times per day    enoxaparin  30 mg SubCUTAneous BID    dexamethasone  20 mg IntraVENous Q24H    carvedilol  6.25 mg Oral BID    pantoprazole  40 mg Oral QAM AC    levothyroxine  150 mcg Oral Daily    rosuvastatin  40 mg Oral Nightly    tamsulosin  0.4 mg Oral Daily       Social History:     Social History     Socioeconomic History    Marital status:      Spouse name: Not on file    Number of children: Not on file    Years of education: Not on file    Highest education level: Not on file   Occupational History    Not on file   Tobacco Use    Smoking status: Never Smoker    Smokeless tobacco: Never Used   Substance and Sexual Activity    Alcohol use: Yes     Comment: daily 1-2 beer, has not drank in one week     Drug use: Not on file    Sexual activity: Not on file   Other Topics Concern    Not on file   Social History Narrative    Not on file     Social Determinants of Health     Financial Resource Strain:     Difficulty of Paying Living Expenses: Not on file   Food Insecurity:     Worried About Running Out of Food in the Last Year: Not on file    Zenaida of Food in the Last Year: Not on file   Transportation Needs:     Lack of Transportation (Medical): Not on file    Lack of Transportation (Non-Medical):  Not on file   Physical Activity:     Days of Exercise per Week: Not on file    Minutes of Exercise per Session: Not on file   Stress:     Feeling of Stress : Not on file   Social Connections:     Frequency of Communication with Friends and Family: Not on file    Frequency of Social Gatherings with Friends and Family: Not on file    Attends Jew Services: Not on file    Active Member of Clubs or Organizations: Not on file    Attends Club or Organization Meetings: Not on file    Marital Status: Not on file   Intimate Partner Violence:     Fear of Current or Ex-Partner: Not on file    Emotionally Abused: Not on file    Physically Abused: Not on file    Sexually Abused: Not on file   Housing Stability:     Unable to Pay for Housing in the Last Year: Not on file    Number of [place] in the Last Year: Not on file    Unstable Housing in the Last Year: Not on file       Family History:   History reviewed. No pertinent family history. Allergies: Other     Review of Systems:       Constitutional:Generalized malaise  Head: No headaches  Eyes: No double vision or blurry vision. No conjunctival inflammation. ENT: No sore throat or runny nose. . No hearing loss, tinnitus or vertigo. Cardiovascular: No chest pain or palpitations. Shortness of breath. EUCEDA  Lung:  Shortness of breath and cough. No sputum production  Abdomen: No nausea, vomiting, diarrhea, or abdominal pain. [employer] No cramps. Genitourinary: No increased urinary frequency, or dysuria. No hematuria. No suprapubic or CVA pain  Musculoskeletal: No muscle aches or pains. No joint effusions, swelling or deformities  Hematologic: No bleeding or bruising. Neurologic: No headache, weakness, numbness, or tingling. Integument: No rash, no ulcers. Psychiatric: No depression. Endocrine: No polyuria, no polydipsia, no polyphagia.     Physical Examination :     Patient Vitals for the past 8 hrs:   BP Temp Temp src Pulse Resp SpO2   11/08/21 0746    75 22 93 %   11/08/21 0730  98.3 °F (36.8 °C) Axillary 68  93 %   11/08/21 0218 (!) 129/92 98.1 °F (36.7 °C)  89 26 91 %     General Appearance: Awake, alert, and in no apparent distress  Head:  Normocephalic, no trauma  Eyes: Pupils equal, round, reactive to light; sclera anicteric; conjunctivae pink. No embolic phenomena. ENT: Oropharynx clear, without erythema, exudate, or thrush. No tenderness of sinuses. Mouth/throat: mucosa pink and moist. No lesions. Dentition in good repair. Neck:Supple, without lymphadenopathy. Thyroid normal, No bruits. Pulmonary/Chest: Diminished to auscultation, without wheezes, rales, or rhonchi. No dullness to percussion. Cardiovascular: Regular rate and rhythm without murmurs, rubs, or gallops. Abdomen: Soft, non tender. Bowel sounds normal. No organomegaly  All four Extremities: No cyanosis, clubbing, edema, or effusions. Neurologic: No gross sensory or motor deficits. Skin: Warm and dry with good turgor. No signs of peripheral arterial or venous insufficiency. No ulcerations. No open wounds. Medical Decision Making -Laboratory:   I have independently reviewed/ordered the following labs:    CBC with Differential:   Recent Labs     11/07/21 0454 11/08/21  0534   WBC 10.2 13.4*   HGB 15.5 15.6   HCT 47.1 47.4    253   LYMPHOPCT 8* 6*   MONOPCT 2 7     BMP:   Recent Labs     11/07/21 0454 11/08/21  0534   * 127*   K 4.3 4.2   CL 89* 90*   CO2 22 21   BUN 15 18   CREATININE 0.77 0.86     Hepatic Function Panel:   Recent Labs     11/07/21 0454 11/08/21  0534   PROT 6.4 6.4   LABALBU 3.5 3.6   BILITOT 0.61 0.65   ALKPHOS 94 96   * 236*   * 152*     No results for input(s): RPR in the last 72 hours. No results for input(s): HIV in the last 72 hours. No results for input(s): BC in the last 72 hours.   Lab Results   Component Value Date    RBC 5.23 11/08/2021    WBC 13.4 11/08/2021     Lab Results   Component Value Date    CREATININE 0.86 11/08/2021    GLUCOSE 137 11/08/2021       Medical Decision Making-Imaging:     Narrative   EXAMINATION:   ONE XRAY VIEW OF THE CHEST       11/3/2021 5:12 pm       COMPARISON:   11/03/2021, 1155 hours.       HISTORY:   ORDERING SYSTEM PROVIDED HISTORY: hypoxia worsening   TECHNOLOGIST PROVIDED HISTORY:   hypoxia worsening   Reason for Exam: worsening hypoxia   upright port       FINDINGS:   No cardiomegaly or interstitial edema was noted.  Patchy alveolar   infiltration was noted in the left lower lobe and bilateral perihilar   regions.  Partial clearing has occurred from 11/03/2021, 1157 hours.  Marked   degenerative osteo arthritic changes involve the left glenohumeral joint. The regional skeleton was unremarkable.  No pneumothorax was seen.           Impression   No cardiomegaly or interstitial edema.       Patchy bilateral perihilar and left lower lobe pneumonia like infiltrate was   noted.  Partial clearing has occurred bilaterally since 11/03/2021, 1157   hours.       No pneumothorax.         Narrative   EXAMINATION:   ONE XRAY VIEW OF THE CHEST       11/6/2021 9:33 am       COMPARISON:   11/04/2021       HISTORY:   ORDERING SYSTEM PROVIDED HISTORY: COvid   TECHNOLOGIST PROVIDED HISTORY:   COvid       FINDINGS:   The heart is similar in size to the prior study.  No pleural effusion or   pneumothorax is seen.  Again seen are patchy interstitial/airspace opacities   in the lungs.           Impression   Patchy interstitial/airspace opacities again seen in the lungs, not   significantly changed from the prior study, compatible with COVID-19   pneumonia.                 Medical Decision [id number]:       Medical Decision Making-Other:     Note:  · Labs, medications, radiologic studies were reviewed with personal review of films  · Large amounts of data were reviewed  · Discussed with nursing Staff, Discharge planner  · Infection Control and Prevention measures reviewed  · All prior entries were reviewed  · Administer medications as ordered  · Prognosis: Guarded  · Discharge planning reviewed      Thank you for allowing us to participate in the care of this patient. Please call with questions.     Erin Solano, POOL - CNP ATTESTATION:    I have discussed the case, including pertinent history and exam findings with the APRN. I have evaluated the  History, physical findings and pictures of the patient and the key elements of the encounter have been performed by me. I have reviewed the laboratory data, other diagnostic studies and discussed them with the APRN. I have updated the medical record where necessary. I agree with the assessment, plan and orders as documented by the APRN.     Omayra Rubio MD.          Pager: [phone] - Office: [phone]

## 2021-11-08 NOTE — PROGRESS NOTES
PULMONARY & CRITICAL CARE MEDICINE PROGRESS NOTE     Patient:  Boom Brito  MRN: [id number]  Admit date: 11/3/2021  Primary Care Physician: Flash Montes DO  Consulting Physician: Rosemarie Taveras MD  CODE Status: Full Code  LOS: 5     SUBJECTIVE     CHIEF COMPLAINT/REASON FOR INITIAL CONSULT:   Acute respiratory failure/COVID-19 pneumonia    BRIEF HOSPITAL COURSE:   The patient is a 60 y.o. male history of obesity, diabetes, hypertension, hyperlipidemia was recently diagnosed to have COVID-19. He went to the infusion center for antibody cocktail. However he was found to be hypoxemic with oxygen saturation in 60s. He was sent to the ER and hospitalized for COVID-19 pneumonia. He was initially on nasal cannula. Subsequently needed initiation of BiPAP. At the time of my evaluation patient was on high flow nasal cannula. Sitting comfortably in the bed. He denies any significant dyspnea. Has productive cough with sputum. He denies any history of sleep apnea. INTERVAL HISTORY:  11/08/21    Overnight patient is afebrile hemodynamically stable T-max is 98.6. He is saturating between 91% to 93% on high flow 95% and 60 L currently. This morning/overnight he had desaturated while he was on high flow nasal cannula 80% and 60 L and currently FiO2 increased to 95% this morning. Patient did not use BiPAP and refused BiPAP according to patient not able to tolerate and feels suffocated. He is able to get out of bed to chair with help of nursing staff he does desaturate on ambulation. Urine output 2600 and last 24 hours  Labs show sodium 127 BUN 18 creatinine 0.86 bicarbonate 21 WBC 13.4 hemoglobin 15.6 platelet 098. Chest x-ray 11/06/2021 as compared to 11/04/2021 showed similar-appearing bilateral infiltrates and opacities.     According patient he had history of smoking in the past per patient only for 5-6 years quit few years ago never diagnosed with COPD or asthma but he was having some symptoms and was started on Trelegy once daily by primary care physician few months ago    REVIEW OF SYSTEMS:  Review of Systems   Constitutional: Positive for appetite change. Negative for fatigue and fever. HENT: Negative for postnasal drip, sinus pain, sore throat, trouble swallowing and voice change. Eyes: Negative for photophobia, redness and visual disturbance. Respiratory: Positive for cough and shortness of breath. Negative for wheezing. Cardiovascular: Negative for chest pain, palpitations and leg swelling. Gastrointestinal: Negative for abdominal pain, blood in stool, diarrhea and vomiting. Endocrine: Negative for polydipsia, polyphagia and polyuria. Genitourinary: Negative for dysuria, frequency and hematuria. Musculoskeletal: Negative. Allergic/Immunologic: Negative. Neurological: Negative for dizziness, syncope, speech difficulty and headaches. Hematological: Negative for adenopathy. Does not bruise/bleed easily. Psychiatric/Behavioral: Negative. OBJECTIVE     PaO2/FiO2 RATIO:  No results for input(s): POCPO2 in the last 72 hours.    FiO2 : 95 %     VITAL SIGNS:   LAST:  /78   Pulse 75   Temp 99.7 °F (37.6 °C) (Axillary)   Resp 22   Ht 6' (1.829 m)   Wt 294 lb 3.2 oz (133.4 kg)   SpO2 93%   BMI 39.90 kg/m²   8-24 HR RANGE:  TEMP Temp  Av.4 °F (36.9 °C)  Min: 97.7 °F (36.5 °C)  Max: 99.7 °F (84.0 °C)   BP Systolic (60IVI), YJO:918 , Min:115 , WDW:425      Diastolic (54WGU), WTN:69, Min:76, Max:94     PULSE Pulse  Av.4  Min: 63  Max: 89   RR Resp  Av  Min: 22  Max: 22   O2 SAT SpO2  Av %  Min: 93 %  Max: 93 %   OXYGEN DELIVERY O2 Flow Rate (L/min)  Av L/min  Min: 60 L/min  Max: 60 L/min        SYSTEMIC EXAMINATION:   General appearance - Ill-appearing, mild respiratory distress/mildly tachypneic  Mental status - awake & alert, follows commands  Eyes - pupils equal and reactive, sclera anicteric  Mouth - mucous membranes moist, pharynx normal without lesions. Large tongue  Neck - supple, no significant adenopathy, carotids upstroke normal bilaterally, no bruits  Chest - Breath sounds bilaterally were dimnished to auscultation at bases. There were mild crackles present at bases, no wheezes, rhonchi. There is no intercostal recession or use of accessory muscles  Heart - normal rate, regular rhythm, normal S1, S2, no murmurs, rubs, clicks or gallops  Abdomen - soft, nontender, nondistended, no masses or organomegaly  Neurological - non-focal.  Cranial nerves intact no gross motor neuro deficit  Extremities - peripheral pulses normal, mild pedal edema, no clubbing or cyanosis  Skin - normal coloration and turgor, no rashes, no suspicious skin lesions noted     DATA REVIEW     Medications: Current Inpatient  Scheduled Meds:   sodium chloride flush  5-40 mL IntraVENous 2 times per day    enoxaparin  30 mg SubCUTAneous BID    dexamethasone  20 mg IntraVENous Q24H    carvedilol  6.25 mg Oral BID    pantoprazole  40 mg Oral QAM AC    levothyroxine  150 mcg Oral Daily    rosuvastatin  40 mg Oral Nightly    tamsulosin  0.4 mg Oral Daily     Continuous Infusions:   sodium chloride         INPUT/OUTPUT:  In: -   Out: 1600 [Urine:1600]       LABS:  ABGs:   No results for input(s): POCPH, POCPCO2, POCPO2, POCHCO3, [id number] in the last 72 hours. CBC:   Recent Labs     11/06/21 0448 11/07/21 0454 11/08/21  0534   WBC 9.1 10.2 13.4*   HGB 17.1* 15.5 15.6   HCT 49.9 47.1 47.4   MCV 88.0 90.6 90.6    211 253   LYMPHOPCT 4* 8* 6*   RBC 5.67 5.20 5.23   MCH 30.2 29.8 29.8   MCHC 34.3 32.9 32.9   RDW 13.6 13.6 13.7     CRP:   No results for input(s): CRP in the last 72 hours. LDH:   No results for input(s): LDH in the last 72 hours.   BMP:   Recent Labs     11/06/21 0448 11/07/21 0454 11/08/21  0534   * 124* 127*   K 4.6 4.3 4.2   CL 89* 89* 90*   CO2 22 22 21   BUN 21* 15 18   CREATININE 0.90 0.77 0.86   GLUCOSE 142* 135* 137*     Liver Function Test: Final Result   Scattered pulmonary opacities consistent with multifocal airspace disease. Echocardiogram:   No results found for this or any previous visit. ASSESSMENT AND PLAN     Assessment:    // Acute hypoxic respiratory failure  // Acute respiratory distress syndrome  // Bilateral multifocal pneumonia due to COVID 19 infection  // Sepsis due to COVID 19  // Probable obstructive sleep apnea  // Hypertension  // Diabetes mellitus  // Morbid obesity    Plan:    I personally interviewed/examined the patient; reviewed interval history, interpreted all available radiographic and laboratory data at the time of service.  Patient remains on high flow nasal cannula and he is on 60 L 95 % and saturating above 92%.  He does have desaturation  with slight ambulation and out bed to chair.  Continue to encourage nocturnal NIV/BiPAP and intermittently during the daytime.  Continue supplemental oxygen to keep oxygen saturation >90%   Received Actemra 11/03/2021   He is on IV Decadron per hospital protocol currently on 20 mg once daily for 5 doses and then suggest to be on 10 mg once daily for 5 days   Encourage prone position when sleeping, incentive spirometry   Continue pulmonary toilet, aspiration precautions and bronchodilators   Consider intermittent diuresis with monitoring of intake and output electrolytes and sodium   Monitor I/O, electrolytes with a goal of negative fluid balance   Chemical DVT prophylaxis as per protocol on Lovenox 30 mg twice daily   Antimicrobials reviewed; currently not on antibiotic   Monitor CRP, LDH, AST/ALT,  periodically   Physical/occupational therapy    The patient is/remains critically ill with illness/injury that acutely impairs one or more vital organ systems, such that there is a high probability of imminent or life threatening deterioration in the patient's condition.  Critical care time of 35 minutes was spent (excluding procedures), in coordination of care during bedside rounds and discussion of patient care in detail, and recommendations of the team were adopted in the plan. Necessity of all invasive devices was also confirmed. Mray Saavedra MD   Pulmonary and Critical Care Medicine           11/8/2021, 11:39 AM    This patient was evaluated in the context of the global SARS-CoV-2 (COVID-19) pandemic, which necessitated considerations that the patient either has COVID-19 infection or is at risk of infection with COVID-19. Institutional protocols and algorithms that pertain to the evaluation & management of patients with COVID-19 or those at risk for COVID-19 are in a state of rapid changes based on information released by regulatory bodies including the CDC and federal and state organizations. These policies and algorithms were followed during the patient's care. Please note that this chart was generated using voice recognition Dragon dictation software. Although every effort was made to ensure the accuracy of this automated transcription, some errors in transcription may have occurred.

## 2021-11-08 NOTE — PLAN OF CARE
Problem: SAFETY  Goal: Free from accidental physical injury  11/8/2021 0426 by Berenice Grider RN  Outcome: Met This Shift     Problem: SAFETY  Goal: Free from intentional harm  11/8/2021 0426 by Berenice Grider RN  Outcome: Met This Shift     Problem: Pain:  Goal: Pain level will decrease  Description: Pain level will decrease  11/8/2021 0426 by Berenice Grider RN  Outcome: Met This Shift     Problem: Pain:  Goal: Control of acute pain  Description: Control of acute pain  11/8/2021 0426 by Berenice Grider RN  Outcome: Met This Shift     Problem: Airway Clearance - Ineffective  Goal: Achieve or maintain patent airway  11/8/2021 0426 by Berenice Grider RN  Outcome: Ongoing     Problem: Gas Exchange - Impaired  Goal: Absence of hypoxia  11/8/2021 0426 by Berenice Grider RN  Outcome: Ongoing     Problem: Gas Exchange - Impaired  Goal: Promote optimal lung function  11/8/2021 0426 by Berenice Grider RN  Outcome: Ongoing     Problem: Breathing Pattern - Ineffective  Goal: Ability to achieve and maintain a regular respiratory rate  11/8/2021 0426 by Berenice Grider RN  Outcome: Ongoing     Problem: Risk for Fluid Volume Deficit  Goal: Maintain normal heart rhythm  11/8/2021 0426 by Berenice Grider RN  Outcome: Ongoing

## 2021-11-08 NOTE — PROGRESS NOTES
[facility]  Office: [address], DO, Oliverio Vangs, DO, Michael Holes, DO, Swathi Key Blood, DO, April Lockett MD, Tova Franklin MD, Kings Haines MD, Ap Monk MD, Jose A Moscoso MD, Titi Mullins MD, Dorcas Westfall MD, Natty Pedro MD, Clyde Guajardo, DO, Taniya Leigh, DO, Meredith Whittaker MD,  Saba Thurman, DO, Ermelinda Galicia MD, An Hamilton MD, Beatris Mayer MD, Gnujan Carter MD, Nerissa Torres MD, George Whipple MD, Glenn Samuels, [facility], [facility] Elena, CNP, Lakeshia Stanley, CNP, Indira Connor, CNS, Mayur Jara, CNP, Serenity Marie, CNP, Naomie Meyers, CNP, Alfonso Interiano, CNP, Melisa Alfred, CNP, Saurabh Sparks, CNP, Herber Devine PABoogieC, Christiane Cartwright, [facility], Jayesh Briggs, CNP, Ari Iqbal, CNP, Deandra Mehta, CNP, Ca Hitchcock, CNP, Severiano Spalding, CNP, Smith Napoleon, [facility], [place]y [place], [address] [place] [address]    Progress Note    11/8/2021    2:44 PM    Name:   Cristela Duverney  MRN:     [id number]     Acct:      [de-identified]   Room:   [address] [place] [address] Day:  5  Admit Date:  11/3/2021 10:59 AM    PCP:   Sheila Gutierrez DO  Code Status:  Full Code    Subjective:     C/C:   Chief Complaint   Patient presents with    Positive For Covid-19     went to infusion center for covid +, stumbling in with wife, RN checked his oxygen and it was 62s, brought over to ed via wheelchair on 4L via NC, increased to 6L with no change and oxygen in 70s, now on NRB. Interval History Status: not changed. pateitn seen and examinedsititng in chair. Still needing high flow nasal cannula. Continues to use incentive spirometer. Brief History:     Mary Betts is a 60 y.o.  Non- / non  male who presents with Positive For Covid-19 (went to infusion center for covid +, stumbling in with wife, RN checked his oxygen and it was 62s, brought over to ed via wheelchair on 4L via NC, increased to 6L with no change and oxygen in 76s, now on NRB. )   and is admitted to the hospital for the management of Pneumonia due to COVID-19 virus.     58-year-old male past medical history of obesity, BPH, hypertension presents with worsening shortness of breath was hypoxic. Patient was originally planning to go to the infusion center for being Covid positive patient requiring 4 L nasal cannula worsening to 6 L and now on BiPAP. Actemra was given 11/3/2021., worsening evaluation and requiring high flow nasal cannula. Review of Systems:     Constitutional:  negative for chills, fevers, sweats  Respiratory:  negative for cough, dyspnea on exertion, shortness of breath, wheezing  Cardiovascular:  negative for chest pain, chest pressure/discomfort, lower extremity edema, palpitations  Gastrointestinal:  negative for abdominal pain, constipation, diarrhea, nausea, vomiting  Neurological:  negative for dizziness, headache    Medications: Allergies: Allergies   Allergen Reactions    Other      Hayfever       Current Meds:   Scheduled Meds:    [START ON 11/11/2021] dexamethasone  10 mg IntraVENous Q24H    sodium chloride flush  5-40 mL IntraVENous 2 times per day    enoxaparin  30 mg SubCUTAneous BID    dexamethasone  20 mg IntraVENous Q24H    carvedilol  6.25 mg Oral BID    pantoprazole  40 mg Oral QAM AC    levothyroxine  150 mcg Oral Daily    rosuvastatin  40 mg Oral Nightly    tamsulosin  0.4 mg Oral Daily     Continuous Infusions:    sodium chloride       PRN Meds: benzonatate, sodium chloride flush, sodium chloride, ondansetron **OR** ondansetron, polyethylene glycol, acetaminophen **OR** acetaminophen    Data:     Past Medical History:   has a past medical history of Diabetes mellitus ([address] Hyperlipidemia, Hypertension, Kidney stone, and Thyroid disease. Social History:   reports that he has never smoked. He has never used smokeless tobacco. He reports current alcohol use. Family History: History reviewed.  No pertinent 11/3/2021  No cardiomegaly or interstitial edema. Patchy bilateral perihilar and left lower lobe pneumonia like infiltrate was noted. Partial clearing has occurred bilaterally since 11/03/2021, 1157 hours. No pneumothorax. Physical Examination:        General appearance:  alert, cooperative and on high flow  Mental Status:  oriented to person, place and time and normal affect  Lungs:  cdistant breath sounds   Heart:  regular rate and rhythm, no murmur  Abdomen:  soft, nontender, nondistended, normal bowel sounds  Extremities:  no edema, redness, tenderness in the calves  Skin:  no gross lesions, rashes, induration    Assessment:        Hospital Problems           Last Modified POA    * (Principal) Acute respiratory failure due to COVID-19 ([facility] [address] 11/3/2021 Yes    Benign prostatic hyperplasia with lower urinary tract symptoms 11/3/2021 Yes    Obesity (BMI 35.0-39.9 without comorbidity) 11/3/2021 Yes    Acquired hypothyroidism 11/4/2021 Yes    Obstructive sleep apnea syndrome 11/4/2021 Yes          Plan:        1. Acute respiratory failure secondary to COVID-19.  high dose decadron Actemra 11/3/2021. Appreciate infectious disease pulmonary recommendations. Decadron high dose  2. Continue incentive spirometry  3. Hypothyroidism continue Synthroid  4. GERD continue Protonix  5. Hypertension Coreg, monitor blood pressure  6. BPH. Flomax  7.  PT/OT    Stephany Mcdonald MD  11/8/2021  2:44 PM

## 2021-11-09 NOTE — PLAN OF CARE
Problem: Airway Clearance - Ineffective  Goal: Achieve or maintain patent airway  Outcome: Ongoing     Problem: Gas Exchange - Impaired  Goal: Absence of hypoxia  11/9/2021 0429 by Rosibel Alcantara RN  Outcome: Ongoing     Problem: Gas Exchange - Impaired  Goal: Promote optimal lung function  11/9/2021 0429 by Rosibel Alcantara RN  Outcome: Ongoing     Problem: Breathing Pattern - Ineffective  Goal: Ability to achieve and maintain a regular respiratory rate  Outcome: Ongoing     Problem: Body Temperature -  Risk of, Imbalanced  Goal: Ability to maintain a body temperature within defined limits  Outcome: Ongoing     Problem: Body Temperature -  Risk of, Imbalanced  Goal: Will regain or maintain usual level of consciousness  Outcome: Ongoing     Problem: Body Temperature -  Risk of, Imbalanced  Goal: Complications related to the disease process, condition or treatment will be avoided or minimized  Outcome: Ongoing     Problem: Nutrition Deficits  Goal: Optimize nutritional status  Outcome: Ongoing     Problem: Risk for Fluid Volume Deficit  Goal: Maintain normal heart rhythm  Outcome: Ongoing     Problem: Risk for Fluid Volume Deficit  Goal: Maintain normal serum potassium, sodium, calcium, phosphorus, and pH  Outcome: Ongoing     Problem: Loneliness or Risk for Loneliness  Goal: Demonstrate positive use of time alone when socialization is not possible  Outcome: Ongoing     Problem: Fatigue  Goal: Verbalize increase energy and improved vitality  Outcome: Ongoing     Problem: SAFETY  Goal: Free from accidental physical injury  Outcome: Ongoing     Problem: SAFETY  Goal: Free from intentional harm  Outcome: Ongoing     Problem: DAILY CARE  Goal: Daily care needs are met  Outcome: Ongoing     Problem: KNOWLEDGE DEFICIT  Goal: Patient/S.O. demonstrates understanding of disease process, treatment plan, medications, and discharge instructions.   Outcome: Ongoing     Problem: DISCHARGE BARRIERS  Goal: Patient's continuum of care needs are met  Outcome: Ongoing     Problem: OXYGENATION/RESPIRATORY FUNCTION  Goal: Patient will maintain patent airway  Outcome: Ongoing     Problem: Pain:  Goal: Pain level will decrease  Description: Pain level will decrease  Outcome: Ongoing

## 2021-11-09 NOTE — PROGRESS NOTES
Phoned phlebotomy regard redraw of AM labs. Writer was updated that they would notify the phlebotomist drawing for CAR 3.

## 2021-11-09 NOTE — PROGRESS NOTES
PULMONARY & CRITICAL CARE MEDICINE PROGRESS NOTE     Patient:  Diana Luis  MRN: [id number]  Admit date: 11/3/2021  Primary Care Physician: Jenaro Steward DO  Consulting Physician: Waylon Ram MD  CODE Status: Full Code  LOS: 6     SUBJECTIVE     CHIEF COMPLAINT/REASON FOR INITIAL CONSULT:   Acute respiratory failure/COVID-19 pneumonia    BRIEF HOSPITAL COURSE:   The patient is a 60 y.o. male history of obesity, diabetes, hypertension, hyperlipidemia was recently diagnosed to have COVID-19. He went to the infusion center for antibody cocktail. However he was found to be hypoxemic with oxygen saturation in 60s. He was sent to the ER and hospitalized for COVID-19 pneumonia. He was initially on nasal cannula. Subsequently needed initiation of BiPAP. At the time of my evaluation patient was on high flow nasal cannula. Sitting comfortably in the bed. He denies any significant dyspnea. Has productive cough with sputum. He denies any history of sleep apnea. INTERVAL HISTORY:  11/09/21    Continues to be on high flow oxygen. He is sitting up in the chair there is no tachypnea. Afebrile  REVIEW OF SYSTEMS:  Review of Systems   Constitutional: Positive for appetite change. Negative for fatigue and fever. HENT: Negative for postnasal drip, sinus pain, sore throat, trouble swallowing and voice change. Eyes: Negative for photophobia, redness and visual disturbance. Respiratory: Positive for cough and shortness of breath. Negative for wheezing. Cardiovascular: Negative for chest pain, palpitations and leg swelling. Gastrointestinal: Negative for abdominal pain, blood in stool, diarrhea and vomiting. Endocrine: Negative for polydipsia, polyphagia and polyuria. Genitourinary: Negative for dysuria, frequency and hematuria. Musculoskeletal: Negative. Allergic/Immunologic: Negative. Neurological: Negative for dizziness, syncope, speech difficulty and headaches.    Hematological: Negative for  enoxaparin  30 mg SubCUTAneous BID    dexamethasone  20 mg IntraVENous Q24H    carvedilol  6.25 mg Oral BID    pantoprazole  40 mg Oral QAM AC    levothyroxine  150 mcg Oral Daily    [Held by provider] rosuvastatin  40 mg Oral Nightly    tamsulosin  0.4 mg Oral Daily     Continuous Infusions:   sodium chloride         INPUT/OUTPUT:  In: 1100 [P.O.:1100]  Out: 650 [Urine:650]  Date 11/09/21 0000 - 11/09/21 2359   Shift 3564-8284 6670-2368 2552-1931 24 Hour Total   INTAKE   P.O.(mL/kg/hr) 700(0.7)   700   Shift Total(mL/kg) 700(5.2)   700(5.2)   OUTPUT   Urine(mL/kg/hr) 650(0.6)   650   Shift Total(mL/kg) 650(4.9)   650(4.9)   Weight (kg) 133.4 133.4 133.4 133.4        LABS:  ABGs:   No results for input(s): POCPH, POCPCO2, POCPO2, POCHCO3, [id number] in the last 72 hours. CBC:   Recent Labs     11/07/21 0454 11/08/21 0534 11/09/21 0527   WBC 10.2 13.4* 16.0*   HGB 15.5 15.6 16.0   HCT 47.1 47.4 47.7   MCV 90.6 90.6 90.3    253 238   LYMPHOPCT 8* 6* 4*   RBC 5.20 5.23 5.28   MCH 29.8 29.8 30.3   MCHC 32.9 32.9 33.5   RDW 13.6 13.7 13.7     CRP:   No results for input(s): CRP in the last 72 hours. LDH:   No results for input(s): LDH in the last 72 hours. BMP:   Recent Labs     11/07/21 0454 11/08/21 0534   * 127*   K 4.3 4.2   CL 89* 90*   CO2 22 21   BUN 15 18   CREATININE 0.77 0.86   GLUCOSE 135* 137*     Liver Function Test:   Recent Labs     11/07/21 0454 11/08/21 0534   PROT 6.4 6.4   LABALBU 3.5 3.6   * 236*   * 152*   ALKPHOS 94 96   BILITOT 0.61 0.65     Coagulation Profile:   No results for input(s): INR, PROTIME, APTT in the last 72 hours. D-Dimer:  No results for input(s): DDIMER in the last 72 hours. Ferritin:    No results for input(s): FERRITIN in the last 72 hours. Lactic Acid:  No results for input(s): LACTA in the last 72 hours. Cardiac Enzymes:  No results for input(s): CKTOTAL, CKMB, CKMBINDEX, TROPONINI in the last 72 hours.     Invalid input(s): TROPONIN, HSTROP  BNP/ProBNP:   No results for input(s): BNP, PROBNP in the last 72 hours. Triglycerides:  No results for input(s): TRIG in the last 72 hours. Microbiology:  Urine Culture:  No components found for: CURINE  Blood Culture:  No components found for: CBLOOD, CFUNGUSBL  Sputum Culture:  No components found for: CSPUTUM  No results for input(s): SPECDESC, SPECIAL, CULTURE, STATUS, ORG, CDIFFTOXPCR, CAMPYLOBPCR, SALMONELLAPC, SHIGAPCR, SHIGELLAPCR, MPNEUG, MPNEUM, LACTOQL in the last 72 hours. No results for input(s): SPUTUM, SPECDESC, SPECIAL, CULTURE, STATUS, ORG, CDIFFTOXPCR, MPNEUM, MPNEUG in the last 72 hours. Invalid input(s): CURINE, CBLOOD, CFUNGUSBL     Pathology:    Radiology Reports:  XR CHEST PORTABLE   Final Result   Patchy interstitial/airspace opacities again seen in the lungs, not   significantly changed from the prior study, compatible with COVID-19   pneumonia. XR CHEST PORTABLE   Final Result   Increased bilateral airspace opacities suggesting worsening COVID pneumonia         XR CHEST PORTABLE   Final Result   No cardiomegaly or interstitial edema. Patchy bilateral perihilar and left lower lobe pneumonia like infiltrate was   noted. Partial clearing has occurred bilaterally since 11/03/2021, 1157   hours. No pneumothorax. XR CHEST (SINGLE VIEW FRONTAL)   Final Result   Scattered pulmonary opacities consistent with multifocal airspace disease. Echocardiogram:   No results found for this or any previous visit.        ASSESSMENT AND PLAN     Assessment:    // Acute hypoxic respiratory failure  // Acute respiratory distress syndrome  // Bilateral multifocal pneumonia due to COVID 19 infection  // Sepsis due to COVID 19  // Probable obstructive sleep apnea  // Hypertension  // Diabetes mellitus  // Morbid obesity    Plan:    I personally interviewed/examined the patient; reviewed interval history, interpreted all available radiographic and

## 2021-11-09 NOTE — PROGRESS NOTES
Pt. Reported to writer that he would wear bipap for 1hr. And no more. Writer phoned resp. To place pt. On bipap, when writer ready to hang up pt. Stated he wasn't going to wear it. Updated resp. Writer offered to ask  For something for anxiety to help with compliance with bipap. Pt. Refused. Will monitor.

## 2021-11-09 NOTE — PROGRESS NOTES
[facility]  Office: [address], DO, Cira Mortony, DO, Zachery Base, DO, Elio Gonzalez Blood, DO, So Escobedo MD, Alice Sexton MD, Eduard Cardenas MD, Alba Huertas MD, Floyd Chairez MD, Kennedy Stevens MD, Kennedy Bhagat MD, Juliet Arambula MD, Marcell Colon, DO, Clem Rodriguez, DO, Gerri Swain MD,  Ramirez Dunaway, DO, Maria C Rangel MD, Robyn Madera MD, Stephanie Holt MD, Jann Pearl MD, Evelio Carrillo MD, Yuan Mayes MD, Flora Teran, [facility], [facility], [facility], Augustine Hickey, CNP, Alcides Henriquez, [facility], Sammy Lama, CNP, Caitlin Beyer, CNP, Julito Rao, CNP, Baron Rossi, CNP, Grant Blanco, [facility], Alanna Gordillo, CNP, Sheri Monk PA-C, [place] Parks, [facility] ([place], [place]), Samson Melendez, CNP, Charly Roberson, CNP, Eduarda Garces, CNP, Sudheer Garces, CNP, Yuan Lindsay, CNP, Gunjan Anders, CNP, Swapna Chu, [address] [place] [address]    Progress Note    11/9/2021    5:04 PM    Name:   Mitul Chavez  MRN:     [id number]     Acct:      [de-identified]   Room:   [address] [place] [address] Day:  6  Admit Date:  11/3/2021 10:59 AM    PCP:   Reji Leyva DO  Code Status:  Limited    Subjective:     C/C:   Chief Complaint   Patient presents with    Positive For Covid-19     went to infusion center for covid +, stumbling in with wife, RN checked his oxygen and it was 62s, brought over to ed via wheelchair on 4L via NC, increased to 6L with no change and oxygen in 70s, now on NRB. Interval History Status: not changed. pateitn seen and examinedsititng in chair. Still needing high flow nasal cannula. Continues to use incentive spirometer. Brief History:     Ariel Arizmendi is a 60 y.o.  Non- / non  male who presents with Positive For Covid-19 (went to infusion center for covid +, stumbling in with wife, RN checked his oxygen and it was 62s, brought over to ed via wheelchair on 4L via NC, increased to 6L with no change and oxygen in 76s, now on NRB. )   and is admitted to the hospital for the management of Pneumonia due to COVID-19 virus.     25-year-old male past medical history of obesity, BPH, hypertension presents with worsening shortness of breath was hypoxic. Patient was originally planning to go to the infusion center for being Covid positive patient requiring 4 L nasal cannula worsening to 6 L and now on BiPAP. Actemra was given 11/3/2021., worsening evaluation and requiring high flow nasal cannula. Review of Systems:     Constitutional:  negative for chills, fevers, sweats  Respiratory:  negative for cough, dyspnea on exertion, shortness of breath, wheezing  Cardiovascular:  negative for chest pain, chest pressure/discomfort, lower extremity edema, palpitations  Gastrointestinal:  negative for abdominal pain, constipation, diarrhea, nausea, vomiting  Neurological:  negative for dizziness, headache    Medications: Allergies: Allergies   Allergen Reactions    Other      Hayfever       Current Meds:   Scheduled Meds:    [START ON 11/11/2021] dexamethasone  10 mg IntraVENous Q24H    sodium chloride flush  5-40 mL IntraVENous 2 times per day    enoxaparin  30 mg SubCUTAneous BID    dexamethasone  20 mg IntraVENous Q24H    carvedilol  6.25 mg Oral BID    pantoprazole  40 mg Oral QAM AC    levothyroxine  150 mcg Oral Daily    [Held by provider] rosuvastatin  40 mg Oral Nightly    tamsulosin  0.4 mg Oral Daily     Continuous Infusions:    sodium chloride       PRN Meds: dextromethorphan-guaiFENesin, LORazepam, benzonatate, sodium chloride flush, sodium chloride, ondansetron **OR** ondansetron, polyethylene glycol, acetaminophen **OR** acetaminophen    Data:     Past Medical History:   has a past medical history of Diabetes mellitus ([address] Hyperlipidemia, Hypertension, Kidney stone, and Thyroid disease. Social History:   reports that he has never smoked.  He has never used smokeless tobacco. He reports current alcohol use.     Family History: History reviewed. No pertinent family history. Vitals:  /78   Pulse 82   Temp 97.9 °F (36.6 °C) (Axillary)   Resp 18   Ht 6' (1.829 m)   Wt 294 lb 3.2 oz (133.4 kg)   SpO2 93%   BMI 39.90 kg/m²   Temp (24hrs), Av.9 °F (36.6 °C), Min:97.6 °F (36.4 °C), Max:98.4 °F (36.9 °C)    No results for input(s): POCGLU in the last 72 hours. I/O (24Hr): Intake/Output Summary (Last 24 hours) at 2021 1704  Last data filed at 2021 1446  Gross per 24 hour   Intake 1050 ml   Output 1625 ml   Net -575 ml       Labs:  Hematology:  Recent Labs     2134 21  0527   WBC 10.2 13.4* 16.0*   RBC 5.20 5.23 5.28   HGB 15.5 15.6 16.0   HCT 47.1 47.4 47.7   MCV 90.6 90.6 90.3   MCH 29.8 29.8 30.3   MCHC 32.9 32.9 33.5   RDW 13.6 13.7 13.7    253 238   MPV 11.4 11.2 11.2     Chemistry:  Recent Labs     21  0534 21  1128   * 127* 131*   K 4.3 4.2 4.2   CL 89* 90* 94*   CO2 22 21 24   GLUCOSE 135* 137* 110*   BUN 15 18 18   CREATININE 0.77 0.86 0.80   MG  --   --  2.5   ANIONGAP 13 16 13   LABGLOM >60 >60 >60   GFRAA >60 >60 >60   CALCIUM 8.3* 8.6 8.7     Recent Labs     21  0534 21  1128   PROT 6.4 6.4 6.4   LABALBU 3.5 3.6 3.7   * 152* 93*   * 236* 161*   ALKPHOS 94 96 100   BILITOT 0.61 0.65 0.69     ABG:  Lab Results   Component Value Date    POCPH 7.503 2021    POCPCO2 32.0 2021    POCPO2 50.9 2021    POCHCO3 25.2 2021    NBEA NOT REPORTED 2021    PBEA 3 2021    ZSF0GRS NOT REPORTED 2021    EHOD2NPM 89 2021    FIO2 70.0 2021     No results found for: SPECIAL  No results found for: CULTURE    Radiology:  XR CHEST (SINGLE VIEW FRONTAL)    Result Date: 11/3/2021  Scattered pulmonary opacities consistent with multifocal airspace disease.      XR CHEST PORTABLE    Result Date: 2021  Increased bilateral airspace opacities suggesting worsening COVID pneumonia     XR CHEST PORTABLE    Result Date: 11/3/2021  No cardiomegaly or interstitial edema. Patchy bilateral perihilar and left lower lobe pneumonia like infiltrate was noted. Partial clearing has occurred bilaterally since 11/03/2021, 1157 hours. No pneumothorax. Physical Examination:        General appearance:  alert, cooperative and on high flow  Mental Status:  oriented to person, place and time and normal affect  Lungs:  cdistant breath sounds   Heart:  regular rate and rhythm, no murmur  Abdomen:  soft, nontender, nondistended, normal bowel sounds  Extremities:  no edema, redness, tenderness in the calves  Skin:  no gross lesions, rashes, induration    Assessment:        Hospital Problems           Last Modified POA    * (Principal) Acute respiratory failure due to COVID-19 ([address] 11/3/2021 Yes    Benign prostatic hyperplasia with lower urinary tract symptoms 11/3/2021 Yes    Obesity (BMI 35.0-39.9 without comorbidity) 11/3/2021 Yes    Acquired hypothyroidism 11/4/2021 Yes    Obstructive sleep apnea syndrome 11/4/2021 Yes          Plan:        1. Acute respiratory failure secondary to COVID-19.  high dose decadron Actemra 11/3/2021. Appreciate infectious disease pulmonary recommendations. Decadron high dose  2. Continue incentive spirometry  3. Hypothyroidism continue Synthroid  4. GERD continue Protonix  5. Hypertension Coreg, monitor blood pressure  6. BPH. Flomax  7. PT/OT    Limited CODE STATUS. Patient reports that she does not want to be intubated at any cost.  Patient is okay with CPR and cardiac resuscitation. Patient was explained in detail how at the time of the respiratory failure if we DO NOT INTUBATE then she may end up with cardiac arrest.  Patient was explained that even if he tried to resuscitate her from just cardiac point of view it may be a futile effort as underlying respiratory issue is not being addressed.   Patient understood the entire process of how cardiac and respiratory arrest could play out but still chooses to have a limited CODE STATUS. Patient is okay for CPR and cardiac medication for sedation measures however is not agreeable to intubation.     Sharon Ford MD  11/9/2021  5:04 PM

## 2021-11-09 NOTE — PLAN OF CARE
Problem: Gas Exchange - Impaired  Goal: Absence of hypoxia  Outcome: Ongoing  Goal: Promote optimal lung function  Outcome: Ongoing   PROVIDE ADEQUATE OXYGENATION WITH ACCEPTABLE SP02/ABG'S    [x]  IDENTIFY APPROPRIATE OXYGEN THERAPY  [x]   MONITOR SP02/ABG'S AS NEEDED   [x]   PATIENT EDUCATION AS NEEDED

## 2021-11-09 NOTE — CARE COORDINATION
Spoke to pt via telephone to discuss transitional planning. He is agreeable with LTACH and would like [facility]. Referral sent.  His ultimate goal is to return home

## 2021-11-09 NOTE — PROGRESS NOTES
[facility] [address] Patient  Today's Date and Time: 11/9/2021, 11:49 AM    Impression :   · COVID 19 Suspect  · COVID 19 Confirmed Infection  · Covid tests:  · 11/1/21: Positive  · 11/4/21: Positive  · Hyponatremia  · Elevated inflammatory markers  · Thrombocytopenia  · Transaminitis  · Essential HTN  · BPH  · Obesity  Recommendations:   · Antibiotic treatment:  · Monitor off antibiotics  · Covid Rx:  · Clinical Research will approach patient to explore if he qualifies for any of the COVID 19 treatment protocols. · Remdesivir-contraindicated because of transaminitis  · Decadron-initiated 11/3/21  · Actemra-Ordered per primary on 11/3/21    Medical Decision Making/Summary/Discussion:11/9/2021     · Patient admitted with suspected COVID 19 infection  · Patient states he tested positive on 11/1/21-Rapid test ordered 11/4/21  · Patient may come out of isolation on 11/22/21    Infection Control Recommendations   · Universal Precautions  · Airborne isolation  · Droplet Isolation    Antimicrobial Stewardship Recommendations     · Discontinuation of therapy  Coordination of Outpatient Care:   · Estimated Length of IV antimicrobials:TBD  · Patient will need Midline Catheter Insertion: TBD  · Patient will need PICC line Insertion: No  · Patient will need: Home IV , [place],  SNF,  LTAC:TBD  · Patient will need outpatient wound care:No    Chief complaint/reason for consultation:   · Concern for COVID infection      History of Present Illness:   Mily Dejesus is a 60y.o.-year-old male who was initially admitted on 11/3/2021. Patient seen at the request of Dr. Ca To:    Patient, who stated he tested positive for Covid on 11/1/21, presented to the infusion center on 11/3/21 for the monoclonal antibody infusion. His symptoms started on 10/28/21. The RN noted his SpO2 was in the low 60s on room air.  He was placed on nasal cannula and brought to the ED.    While in the ED, his oxygen did not improve with NC and he was put on non-re-breather and then BiPAP. His CRP was 86.2. The patient was started on Decadron and administered Actemra. CXR 11/3/21  Patchy bilateral perihilar and left lower lobe pneumonia like infiltrate      Patient admitted because of concerns with COVID 19.    CURRENT EVALUATION : 11/9/2021    Afebrile  VS stable    The patient continues on hi-flow NC and is refusing to wear BiPAP  He is alert and oriented. He received Actemra on 11/3/21    Leukocytosis increasing    Patient exhibiting respiratory distress. yes  Respiratory secretions:     Patient receiving supplemental oxygen. Hi-flow NC  RR: 16-->23-->24-->20  02 sat: 95-->93-->93-->92    % FIO2: 90-->95-->94  Flow rate: 60 L/min  PEEP:      QTc:       NEWS Score: 0-4 Low risk group; 5-6: Medium risk group; 7 or above: High risk group  Parameters 3 2 1 0 1 2 3   Age    < 65   ? 65   RR ? 8  9-11 12-20  21-24 ? 25   O2 Sats ? 91 92-93 94-95 ? 96      Suppl O2  Yes  No      SBP ? 90  101-110 111-219   ? 220   HR ? 40  41-50 51-90  111-130 ? 131   Consciousness    Alert   Drowsiness, lethargy, or confusion   Temperature ? 35.0 C (95.0 F)  35.1-36.0 C 95.1-96.9 F 36.1-38.0 C 97.0-100.4 F 38.1-39.0 C 100.5-102.3 F ? 39.1 C ? 102.4 F      NEWS Score:   11/4/21: 3 Low risk    Overall Daily Picture:      Worsened    Presence of secondary bacterial Infection:    No   Additional antibiotics: No    Labs, X rays reviewed: 11/9/2021    Na: 126-->129-->127  BUN:16-->21-->18  Cr:0.85-->0.91-->0.86    WBC:2.7-->6.2-->10.2-->13.4-->16.0  Hb:15.8-->15.6-->16  Plat: 94-->141-->253-->238    Absolute Neutrophils:1.72  Absolute Lymphocytes:0.68  Neutrophil/Lymphocyte Ratio: 2.5 low risk    CRP:86.2  Ferritin:2936  LDH: 794    Pro Calcitonin:      Cultures:  Urine:  ·   Blood:  ·   Sputum :  ·   Wound:       CXR:     11/7/21    11/3/21    CAT:      Discussed with patient, RN, CC, IM.     I have personally reviewed the past medical history, past surgical history, medications, social history, and family history, and I have updated the database accordingly. Past Medical History:     Past Medical History:   Diagnosis Date    Diabetes mellitus ([facility] [address]     Hyperlipidemia     Hypertension     Kidney stone     Thyroid disease        Past Surgical  History:     Past Surgical History:   Procedure Laterality Date    HERNIA REPAIR      SHOULDER ARTHROPLASTY         Medications:      [START ON 11/11/2021] dexamethasone  10 mg IntraVENous Q24H    sodium chloride flush  5-40 mL IntraVENous 2 times per day    enoxaparin  30 mg SubCUTAneous BID    dexamethasone  20 mg IntraVENous Q24H    carvedilol  6.25 mg Oral BID    pantoprazole  40 mg Oral QAM AC    levothyroxine  150 mcg Oral Daily    [Held by provider] rosuvastatin  40 mg Oral Nightly    tamsulosin  0.4 mg Oral Daily       Social History:     Social History     Socioeconomic History    Marital status:      Spouse name: Not on file    Number of children: Not on file    Years of education: Not on file    Highest education level: Not on file   Occupational History    Not on file   Tobacco Use    Smoking status: Never Smoker    Smokeless tobacco: Never Used   Substance and Sexual Activity    Alcohol use: Yes     Comment: daily 1-2 beer, has not drank in one week     Drug use: Not on file    Sexual activity: Not on file   Other Topics Concern    Not on file   Social History Narrative    Not on file     Social Determinants of Health     Financial Resource Strain:     Difficulty of Paying Living Expenses: Not on file   Food Insecurity:     Worried About Running Out of Food in the Last Year: Not on file    Zenaida of Food in the Last Year: Not on file   Transportation Needs:     Lack of Transportation (Medical): Not on file    Lack of Transportation (Non-Medical):  Not on file   Physical Activity:     Days of Exercise per Week: Not on file    Minutes of Exercise per Session: Not on file   Stress:     Feeling of Stress : Not on file   Social Connections:     Frequency of Communication with Friends and Family: Not on file    Frequency of Social Gatherings with Friends and Family: Not on file    Attends Islam Services: Not on file    Active Member of Clubs or Organizations: Not on file    Attends Club or Organization Meetings: Not on file    Marital Status: Not on file   Intimate Partner Violence:     Fear of Current or Ex-Partner: Not on file    Emotionally Abused: Not on file    Physically Abused: Not on file    Sexually Abused: Not on file   Housing Stability:     Unable to Pay for Housing in the Last Year: Not on file    Number of [place] in the Last Year: Not on file    Unstable Housing in the Last Year: Not on file       Family History:   History reviewed. No pertinent family history. Allergies: Other     Review of Systems:       Constitutional:Generalized malaise  Head: No headaches  Eyes: No double vision or blurry vision. No conjunctival inflammation. ENT: No sore throat or runny nose. . No hearing loss, tinnitus or vertigo. Cardiovascular: No chest pain or palpitations. Shortness of breath. EUCEDA  Lung:  Shortness of breath and cough. No sputum production  Abdomen: No nausea, vomiting, diarrhea, or abdominal pain. Lora Remedies No cramps. Genitourinary: No increased urinary frequency, or dysuria. No hematuria. No suprapubic or CVA pain  Musculoskeletal: No muscle aches or pains. No joint effusions, swelling or deformities  Hematologic: No bleeding or bruising. Neurologic: No headache, weakness, numbness, or tingling. Integument: No rash, no ulcers. Psychiatric: No depression. Endocrine: No polyuria, no polydipsia, no polyphagia.     Physical Examination :     Patient Vitals for the past 8 hrs:   BP Temp Temp src Pulse Resp SpO2   11/09/21 1104 125/88 98.4 °F (36.9 °C) Axillary 82 20    11/09/21 0804     23 92 %   11/09/21 0730 115/70 97.6 °F (36.4 °C) Oral 79 (!) 31 90 %   11/09/21 0409     30 (!) 85 %     General Appearance: Awake, alert, and in no apparent distress  Head:  Normocephalic, no trauma  Eyes: Pupils equal, round, reactive to light; sclera anicteric; conjunctivae pink. No embolic phenomena. ENT: Oropharynx clear, without erythema, exudate, or thrush. No tenderness of sinuses. Mouth/throat: mucosa pink and moist. No lesions. Dentition in good repair. Neck:Supple, without lymphadenopathy. Thyroid normal, No bruits. Pulmonary/Chest: Diminished to auscultation, without wheezes, rales, or rhonchi. No dullness to percussion. Cardiovascular: Regular rate and rhythm without murmurs, rubs, or gallops. Abdomen: Soft, non tender. Bowel sounds normal. No organomegaly  All four Extremities: No cyanosis, clubbing, edema, or effusions. Neurologic: No gross sensory or motor deficits. Skin: Warm and dry with good turgor. No signs of peripheral arterial or venous insufficiency. No ulcerations. No open wounds. Medical Decision Making -Laboratory:   I have independently reviewed/ordered the following labs:    CBC with Differential:   Recent Labs     11/08/21 0534 11/09/21 0527   WBC 13.4* 16.0*   HGB 15.6 16.0   HCT 47.4 47.7    238   LYMPHOPCT 6* 4*   MONOPCT 7 5     BMP:   Recent Labs     11/07/21 0454 11/08/21 0534   * 127*   K 4.3 4.2   CL 89* 90*   CO2 22 21   BUN 15 18   CREATININE 0.77 0.86     Hepatic Function Panel:   Recent Labs     11/07/21 0454 11/08/21 0534   PROT 6.4 6.4   LABALBU 3.5 3.6   BILITOT 0.61 0.65   ALKPHOS 94 96   * 236*   * 152*     No results for input(s): RPR in the last 72 hours. No results for input(s): HIV in the last 72 hours. No results for input(s): BC in the last 72 hours.   Lab Results   Component Value Date    RBC 5.28 11/09/2021    WBC 16.0 11/09/2021     Lab Results   Component Value Date    CREATININE 0.86 11/08/2021    GLUCOSE 137 11/08/2021       Medical Decision Making-Imaging:     Narrative   EXAMINATION:   ONE XRAY VIEW OF THE CHEST       11/3/2021 5:12 pm       COMPARISON:   11/03/2021, 1155 hours.       HISTORY:   ORDERING SYSTEM PROVIDED HISTORY: hypoxia worsening   TECHNOLOGIST PROVIDED HISTORY:   hypoxia worsening   Reason for Exam: worsening hypoxia   upright port       FINDINGS:   No cardiomegaly or interstitial edema was noted.  Patchy alveolar   infiltration was noted in the left lower lobe and bilateral perihilar   regions.  Partial clearing has occurred from 11/03/2021, 1157 hours.  Marked   degenerative osteo arthritic changes involve the left glenohumeral joint.    The regional skeleton was unremarkable.  No pneumothorax was seen.           Impression   No cardiomegaly or interstitial edema.       Patchy bilateral perihilar and left lower lobe pneumonia like infiltrate was   noted.  Partial clearing has occurred bilaterally since 11/03/2021, 1157   hours.       No pneumothorax.         Narrative   EXAMINATION:   ONE XRAY VIEW OF THE CHEST       11/6/2021 9:33 am       COMPARISON:   11/04/2021       HISTORY:   ORDERING SYSTEM PROVIDED HISTORY: COvid   TECHNOLOGIST PROVIDED HISTORY:   COvid       FINDINGS:   The heart is similar in size to the prior study.  No pleural effusion or   pneumothorax is seen.  Again seen are patchy interstitial/airspace opacities   in the lungs.           Impression   Patchy interstitial/airspace opacities again seen in the lungs, not   significantly changed from the prior study, compatible with COVID-19   pneumonia.                 Medical Decision [id number]:       Medical Decision Making-Other:     Note:  · Labs, medications, radiologic studies were reviewed with personal review of films  · Large amounts of data were reviewed  · Discussed with nursing Staff, Discharge planner  · Infection Control and Prevention measures reviewed  · All prior entries were reviewed  · Administer medications as ordered  · Prognosis: Guarded  · Discharge planning reviewed      Thank you for allowing us to participate in the care of this patient. Please call with questions. Chay Lorenzo APRN - CNP     ATTESTATION:    I have discussed the case, including pertinent history and exam findings with the APRN. I have evaluated the  History, physical findings and pictures of the patient and the key elements of the encounter have been performed by me. I have reviewed the laboratory data, other diagnostic studies and discussed them with the APRN. I have updated the medical record where necessary. I agree with the assessment, plan and orders as documented by the APRN.     Gisela Montejo MD.        Pager: [phone] - Office: [phone]

## 2021-11-09 NOTE — PROGRESS NOTES
Pt. Sitting in chair O2 sat 83%. Resp. To room to increase HF, HF increased to 94%  Pt. Sating 89% now. Writer and resp. Therapist encouraged pt. To wear Bipap, pt. Reports he cannot tolerate bipap as his throat hurts (cough drops offered pt. Refused)  Writer educated pt. That he is almost maxed out on the HF and if he is unable to maintain his O2 sat on that then the next step would be Bipap and if he is not able to maintain on that them intubation would be the next step. Pt. Reports he does not want intubated. Will notify  With rounds.

## 2021-11-09 NOTE — PROGRESS NOTES
Monitor, NSR with multifocal PVC's. Pt. Asymptomatic, Attending notified, orders received. Awaiting lab.

## 2021-11-10 NOTE — PLAN OF CARE
Problem: Airway Clearance - Ineffective  Goal: Achieve or maintain patent airway  11/10/2021 0604 by Charles Rivera RN  Outcome: Ongoing  11/10/2021 0143 by Hilario Benavides RCP  Outcome: Ongoing     Problem: Gas Exchange - Impaired  Goal: Absence of hypoxia  11/10/2021 0604 by Charles Rivera RN  Outcome: Ongoing  11/10/2021 0143 by Hilario Benavides RCP  Outcome: Ongoing  Goal: Promote optimal lung function  11/10/2021 0604 by Charles Rivera RN  Outcome: Ongoing  11/10/2021 0143 by Hilario Benavides RCP  Outcome: Ongoing     Problem: Breathing Pattern - Ineffective  Goal: Ability to achieve and maintain a regular respiratory rate  11/10/2021 0604 by Charles Rivera RN  Outcome: Ongoing  11/10/2021 0143 by Hilario Benavides RCP  Outcome: Ongoing     Problem:  Body Temperature -  Risk of, Imbalanced  Goal: Ability to maintain a body temperature within defined limits  Outcome: Ongoing  Goal: Will regain or maintain usual level of consciousness  Outcome: Ongoing  Goal: Complications related to the disease process, condition or treatment will be avoided or minimized  Outcome: Ongoing     Problem: Isolation Precautions - Risk of Spread of Infection  Goal: Prevent transmission of infection  Outcome: Ongoing     Problem: Nutrition Deficits  Goal: Optimize nutritional status  Outcome: Ongoing     Problem: Risk for Fluid Volume Deficit  Goal: Maintain normal heart rhythm  Outcome: Ongoing  Goal: Maintain absence of muscle cramping  Outcome: Ongoing  Goal: Maintain normal serum potassium, sodium, calcium, phosphorus, and pH  Outcome: Ongoing     Problem: Loneliness or Risk for Loneliness  Goal: Demonstrate positive use of time alone when socialization is not possible  Outcome: Ongoing     Problem: Fatigue  Goal: Verbalize increase energy and improved vitality  Outcome: Ongoing     Problem: Patient Education: Go to Patient Education Activity  Goal: Patient/Family Education  Outcome: Ongoing     Problem: SAFETY  Goal: Free from accidental physical injury  Outcome: Ongoing  Goal: Free from intentional harm  Outcome: Ongoing     Problem: DAILY CARE  Goal: Daily care needs are met  Outcome: Ongoing     Problem: PAIN  Goal: Patient's pain/discomfort is manageable  Outcome: Ongoing     Problem: SKIN INTEGRITY  Goal: Skin integrity is maintained or improved  Outcome: Ongoing     Problem: KNOWLEDGE DEFICIT  Goal: Patient/S.O. demonstrates understanding of disease process, treatment plan, medications, and discharge instructions.   Outcome: Ongoing     Problem: DISCHARGE BARRIERS  Goal: Patient's continuum of care needs are met  Outcome: Ongoing     Problem: OXYGENATION/RESPIRATORY FUNCTION  Goal: Patient will maintain patent airway  Outcome: Ongoing  Goal: Patient will achieve/maintain normal respiratory rate/effort  Description: Respiratory rate and effort will be within normal limits for the patient  Outcome: Ongoing     Problem: Pain:  Goal: Pain level will decrease  Description: Pain level will decrease  Outcome: Ongoing  Goal: Control of acute pain  Description: Control of acute pain  Outcome: Ongoing  Goal: Control of chronic pain  Description: Control of chronic pain  Outcome: Ongoing

## 2021-11-10 NOTE — PROGRESS NOTES
[facility] [address] Patient  Today's Date and Time: 11/10/2021, 12:24 PM    Impression :   · COVID 19 Suspect  · COVID 19 Confirmed Infection  · Covid tests:  · 11/1/21: Positive  · 11/4/21: Positive  · Hyponatremia  · Elevated inflammatory markers  · Thrombocytopenia  · Transaminitis  · Essential HTN  · BPH  · Obesity  Recommendations:   · Antibiotic treatment:  · Monitor off antibiotics  · Covid Rx:  · Clinical Research will approach patient to explore if he qualifies for any of the COVID 19 treatment protocols. · Remdesivir-contraindicated because of transaminitis  · Decadron-initiated 11/3/21  · Actemra-Ordered per primary on 11/3/21    Medical Decision Making/Summary/Discussion:11/10/2021     · Patient admitted with suspected COVID 19 infection  · Patient states he tested positive on 11/1/21-Rapid test ordered 11/4/21  · Patient may come out of isolation on 11/22/21    Infection Control Recommendations   · Universal Precautions  · Airborne isolation  · Droplet Isolation    Antimicrobial Stewardship Recommendations     · Discontinuation of therapy  Coordination of Outpatient Care:   · Estimated Length of IV antimicrobials:TBD  · Patient will need Midline Catheter Insertion: TBD  · Patient will need PICC line Insertion: No  · Patient will need: Home IV , [place],  SNF,  LTAC:TBD  · Patient will need outpatient wound care:No    Chief complaint/reason for consultation:   · Concern for COVID infection      History of Present Illness:   Tory Chirinos is a 60y.o.-year-old male who was initially admitted on 11/3/2021. Patient seen at the request of Dr. Mary Beth Rivera:    Patient, who stated he tested positive for Covid on 11/1/21, presented to the infusion center on 11/3/21 for the monoclonal antibody infusion. His symptoms started on 10/28/21. The RN noted his SpO2 was in the low 60s on room air.  He was placed on nasal cannula and brought to the ED. While in the ED, his oxygen did not improve with NC and he was put on non-re-breather and then BiPAP. His CRP was 86.2. The patient was started on Decadron and administered Actemra. CXR 11/3/21  Patchy bilateral perihilar and left lower lobe pneumonia like infiltrate      Patient admitted because of concerns with COVID 19.    CURRENT EVALUATION : 11/10/2021    Afebrile  VS stable    The patient continues on hi-flow NC and is continuing to refuse to wear BiPAP  He is alert and oriented. He received Actemra on 11/3/21    Leukocytosis increasing    Patient exhibiting respiratory distress. yes  Respiratory secretions:     Patient receiving supplemental oxygen. Hi-flow NC  RR: 28  02 sat: 96    % FIO2: 90-->95-->94  Flow rate: 60 L/min  PEEP:      QTc:       NEWS Score: 0-4 Low risk group; 5-6: Medium risk group; 7 or above: High risk group  Parameters 3 2 1 0 1 2 3   Age    < 65   ? 65   RR ? 8  9-11 12-20  21-24 ? 25   O2 Sats ?  91 92-93 94-95 ? 96      Suppl O2  Yes  No      SBP ? 90  101-110 111-219   ? 220   HR ? 40  41-50 51-90  111-130 ? 131   Consciousness    Alert   Drowsiness, lethargy, or confusion   Temperature ? 35.0 C (95.0 F)  35.1-36.0 C 95.1-96.9 F 36.1-38.0 C 97.0-100.4 F 38.1-39.0 C 100.5-102.3 F ? 39.1 C ? 102.4 F      NEWS Score:   11/4/21: 3 Low risk    Overall Daily Picture:      Unchanged    Presence of secondary bacterial Infection:    No   Additional antibiotics: No    Labs, X rays reviewed: 11/10/2021    Na: 126-->129-->127-->1310  BUN:16-->21-->18-->17  Cr:0.85-->0.91-->0.86-->0.90    WBC:2.7-->6.2-->10.2-->13.4-->16.0-->17.6  Hb:16.6  Plat: 134    Absolute Neutrophils:1.72  Absolute Lymphocytes:0.68  Neutrophil/Lymphocyte Ratio: 2.5 low risk    CRP:86.2  Ferritin:2936  LDH: 794    Pro Calcitonin:      Cultures:  Urine:  · 11/10: pending  Blood:  · 11/10: Pending  Sputum :  ·   Wound:       CXR:     11/9/21      11/7/21    11/3/21    CAT:      Discussed with patient, RN, CC, IM. I have personally reviewed the past medical history, past surgical history, medications, social history, and family history, and I have updated the database accordingly. Past Medical History:     Past Medical History:   Diagnosis Date    Diabetes mellitus ([facility] [address]     Hyperlipidemia     Hypertension     Kidney stone     Thyroid disease        Past Surgical  History:     Past Surgical History:   Procedure Laterality Date    HERNIA REPAIR      SHOULDER ARTHROPLASTY         Medications:      [START ON 11/11/2021] dexamethasone  10 mg IntraVENous Q24H    sodium chloride flush  5-40 mL IntraVENous 2 times per day    enoxaparin  30 mg SubCUTAneous BID    carvedilol  6.25 mg Oral BID    pantoprazole  40 mg Oral QAM AC    levothyroxine  150 mcg Oral Daily    [Held by provider] rosuvastatin  40 mg Oral Nightly    tamsulosin  0.4 mg Oral Daily       Social History:     Social History     Socioeconomic History    Marital status:      Spouse name: Not on file    Number of children: Not on file    Years of education: Not on file    Highest education level: Not on file   Occupational History    Not on file   Tobacco Use    Smoking status: Never Smoker    Smokeless tobacco: Never Used   Substance and Sexual Activity    Alcohol use: Yes     Comment: daily 1-2 beer, has not drank in one week     Drug use: Not on file    Sexual activity: Not on file   Other Topics Concern    Not on file   Social History Narrative    Not on file     Social Determinants of Health     Financial Resource Strain:     Difficulty of Paying Living Expenses: Not on file   Food Insecurity:     Worried About Running Out of Food in the Last Year: Not on file    Zenaida of Food in the Last Year: Not on file   Transportation Needs:     Lack of Transportation (Medical): Not on file    Lack of Transportation (Non-Medical):  Not on file   Physical Activity:     Days of Exercise per Week: Not on file    Minutes of Exercise per Session: Not on file   Stress:     Feeling of Stress : Not on file   Social Connections:     Frequency of Communication with Friends and Family: Not on file    Frequency of Social Gatherings with Friends and Family: Not on file    Attends Rastafarian Services: Not on file    Active Member of Clubs or Organizations: Not on file    Attends Club or Organization Meetings: Not on file    Marital Status: Not on file   Intimate Partner Violence:     Fear of Current or Ex-Partner: Not on file    Emotionally Abused: Not on file    Physically Abused: Not on file    Sexually Abused: Not on file   Housing Stability:     Unable to Pay for Housing in the Last Year: Not on file    Number of Jitashmominal in the Last Year: Not on file    Unstable Housing in the Last Year: Not on file       Family History:   History reviewed. No pertinent family history. Allergies: Other     Review of Systems:       Constitutional:Generalized malaise  Head: No headaches  Eyes: No double vision or blurry vision. No conjunctival inflammation. ENT: No sore throat or runny nose. . No hearing loss, tinnitus or vertigo. Cardiovascular: No chest pain or palpitations. Shortness of breath. EUCEDA  Lung:  Shortness of breath and cough. No sputum production  Abdomen: No nausea, vomiting, diarrhea, or abdominal pain. [place] Crape No cramps. Genitourinary: No increased urinary frequency, or dysuria. No hematuria. No suprapubic or CVA pain  Musculoskeletal: No muscle aches or pains. No joint effusions, swelling or deformities  Hematologic: No bleeding or bruising. Neurologic: No headache, weakness, numbness, or tingling. Integument: No rash, no ulcers. Psychiatric: No depression. Endocrine: No polyuria, no polydipsia, no polyphagia.     Physical Examination :     Patient Vitals for the past 8 hrs:   BP Temp Temp src Pulse Resp SpO2   11/10/21 0917 128/80 98.1 °F (36.7 °C) Axillary 74 22 98 %   11/10/21 0753     26 94 % General Appearance: Awake, alert, and in no apparent distress  Head:  Normocephalic, no trauma  Eyes: Pupils equal, round, reactive to light; sclera anicteric; conjunctivae pink. No embolic phenomena. ENT: Oropharynx clear, without erythema, exudate, or thrush. No tenderness of sinuses. Mouth/throat: mucosa pink and moist. No lesions. Dentition in good repair. Neck:Supple, without lymphadenopathy. Thyroid normal, No bruits. Pulmonary/Chest: Diminished to auscultation, without wheezes, rales, or rhonchi. No dullness to percussion. Cardiovascular: Regular rate and rhythm without murmurs, rubs, or gallops. Abdomen: Soft, non tender. Bowel sounds normal. No organomegaly  All four Extremities: No cyanosis, clubbing, edema, or effusions. Neurologic: No gross sensory or motor deficits. Skin: Warm and dry with good turgor. No signs of peripheral arterial or venous insufficiency. No ulcerations. No open wounds. Medical Decision Making -Laboratory:   I have independently reviewed/ordered the following labs:    CBC with Differential:   Recent Labs     11/09/21  0527 11/10/21  0531   WBC 16.0* 17.6*   HGB 16.0 16.6   HCT 47.7 50.2    See Reflexed IPF Result   LYMPHOPCT 4* 4*   MONOPCT 5 5     BMP:   Recent Labs     11/09/21  1128 11/10/21  0531   * 130*   K 4.2 4.4   CL 94* 93*   CO2 24 26   BUN 18 17   CREATININE 0.80 0.90   MG 2.5  --      Hepatic Function Panel:   Recent Labs     11/09/21  1128 11/10/21  0531   PROT 6.4 6.2*   LABALBU 3.7 3.6   BILITOT 0.69 0.79   ALKPHOS 100 104   * 134*   AST 93* 73*     No results for input(s): RPR in the last 72 hours. No results for input(s): HIV in the last 72 hours. No results for input(s): BC in the last 72 hours.   Lab Results   Component Value Date    RBC 5.48 11/10/2021    WBC 17.6 11/10/2021     Lab Results   Component Value Date    CREATININE 0.90 11/10/2021    GLUCOSE 120 11/10/2021       Medical Decision Making-Imaging:     Narrative EXAMINATION:   ONE XRAY VIEW OF THE CHEST       11/3/2021 5:12 pm       COMPARISON:   11/03/2021, 1155 hours.       HISTORY:   ORDERING SYSTEM PROVIDED HISTORY: hypoxia worsening   TECHNOLOGIST PROVIDED HISTORY:   hypoxia worsening   Reason for Exam: worsening hypoxia   upright port       FINDINGS:   No cardiomegaly or interstitial edema was noted.  Patchy alveolar   infiltration was noted in the left lower lobe and bilateral perihilar   regions.  Partial clearing has occurred from 11/03/2021, 1157 hours.  Marked   degenerative osteo arthritic changes involve the left glenohumeral joint.    The regional skeleton was unremarkable.  No pneumothorax was seen.           Impression   No cardiomegaly or interstitial edema.       Patchy bilateral perihilar and left lower lobe pneumonia like infiltrate was   noted.  Partial clearing has occurred bilaterally since 11/03/2021, 1157   hours.       No pneumothorax.         Narrative   EXAMINATION:   ONE XRAY VIEW OF THE CHEST       11/6/2021 9:33 am       COMPARISON:   11/04/2021       HISTORY:   ORDERING SYSTEM PROVIDED HISTORY: COvid   TECHNOLOGIST PROVIDED HISTORY:   COvid       FINDINGS:   The heart is similar in size to the prior study.  No pleural effusion or   pneumothorax is seen.  Again seen are patchy interstitial/airspace opacities   in the lungs.           Impression   Patchy interstitial/airspace opacities again seen in the lungs, not   significantly changed from the prior study, compatible with COVID-19   pneumonia.                 Medical Decision [id number]:       Medical Decision Making-Other:     Note:  · Labs, medications, radiologic studies were reviewed with personal review of films  · Large amounts of data were reviewed  · Discussed with nursing Staff, Discharge planner  · Infection Control and Prevention measures reviewed  · All prior entries were reviewed  · Administer medications as ordered  · Prognosis: Guarded  · Discharge planning reviewed      Thank you for allowing us to participate in the care of this patient. Please call with questions. Micaela Nickerson, APRN - CNP     ATTESTATION:    I have discussed the case, including pertinent history and exam findings with the APRN. I have evaluated the  History, physical findings and pictures of the patient and the key elements of the encounter have been performed by me. I have reviewed the laboratory data, other diagnostic studies and discussed them with the APRN. I have updated the medical record where necessary. I agree with the assessment, plan and orders as documented by the APRN.     To Tariq MD.        Pager: [phone] - Office: [phone]

## 2021-11-10 NOTE — PROGRESS NOTES
[facility]  Office: [address], DO, Vivien Hicks, DO, Valerie King, DO, Kady Diego Blood, DO, Tamra Pimentel MD, Ibrahima Saab MD, Ghislaine Alaniz MD, Geena Srinivasan MD, Timothy Lew MD, Tommie Ryan MD, Bernarda Thurman MD, Rosa Cabrales MD, Matti Sun, DO, Yvonne High, DO, Dany Caldera MD,  Rima Carvalho, DO, Katarina Coronado MD, Herminia Woodward MD, Fay Saha MD, Treva Aly MD, Henrry Vieyra MD, Franchesca Chung MD, Marcel Maradiaga, [facility], CNP, Mason Carrillo, CNP, Adelina Fitzgerald, CNS, Le Parsons, CNP, Seth Diana, CNP, Nelli Clark, CNP, Santos Villalta, CNP, Tamy Steele, CNP, Saniya Cat, CNP, Angelica Hardin PA-C, Irma Hirsch, [facility], Sidra Hoffmann, CNP, Radha Mary, CNP, Jeane Marie, CNP, Xiomara Browning, CNP, Gisell Mojica, CNP, Nate Escobar, CNP, Yvonne Rincon, [address] [place] [address]    Progress Note    11/10/2021    3:44 PM    Name:   Carlos Doyle  MRN:     [id number]     Acct:      [de-identified]   Room:   [address] [place] [address] Day:  7  Admit Date:  11/3/2021 10:59 AM    PCP:   Eva Jay DO  Code Status:  Limited    Subjective:     C/C:   Chief Complaint   Patient presents with   Tahira Sharif Positive For Covid-19     went to infusion center for covid +, stumbling in with wife, RN checked his oxygen and it was 62s, brought over to ed via wheelchair on 4L via NC, increased to 6L with no change and oxygen in 70s, now on NRB. Interval History Status: not changed. pateitn seen and examinedsititng in chair. Still needing high flow nasal cannula. Continues to use incentive spirometer. Brief History:     Carrol Salvador is a 60 y.o.  Non- / non  male who presents with Positive For Covid-19 (went to infusion center for covid +, stumbling in with wife, RN checked his oxygen and it was 62s, brought over to ed via wheelchair on 4L via NC, increased to 6L with no change and oxygen in 76s, now on NRB. )   and is admitted to the hospital for the management of Pneumonia due to COVID-19 virus.     58-year-old male past medical history of obesity, BPH, hypertension presents with worsening shortness of breath was hypoxic. Patient was originally planning to go to the infusion center for being Covid positive patient requiring 4 L nasal cannula worsening to 6 L and now on BiPAP. Actemra was given 11/3/2021., worsening evaluation and requiring high flow nasal cannula. Review of Systems:     Constitutional:  negative for chills, fevers, sweats  Respiratory:  negative for cough, dyspnea on exertion, shortness of breath, wheezing  Cardiovascular:  negative for chest pain, chest pressure/discomfort, lower extremity edema, palpitations  Gastrointestinal:  negative for abdominal pain, constipation, diarrhea, nausea, vomiting  Neurological:  negative for dizziness, headache    Medications: Allergies: Allergies   Allergen Reactions    Other      Hayfever       Current Meds:   Scheduled Meds:    [START ON 11/11/2021] dexamethasone  10 mg IntraVENous Q24H    sodium chloride flush  5-40 mL IntraVENous 2 times per day    enoxaparin  30 mg SubCUTAneous BID    carvedilol  6.25 mg Oral BID    pantoprazole  40 mg Oral QAM AC    levothyroxine  150 mcg Oral Daily    [Held by provider] rosuvastatin  40 mg Oral Nightly    tamsulosin  0.4 mg Oral Daily     Continuous Infusions:    sodium chloride       PRN Meds: dextromethorphan-guaiFENesin, LORazepam, benzonatate, sodium chloride flush, sodium chloride, ondansetron **OR** ondansetron, polyethylene glycol, acetaminophen **OR** acetaminophen    Data:     Past Medical History:   has a past medical history of Diabetes mellitus ([address] Hyperlipidemia, Hypertension, Kidney stone, and Thyroid disease. Social History:   reports that he has never smoked. He has never used smokeless tobacco. He reports current alcohol use. Family History: History reviewed. No pertinent family history. Vitals:  /76   Pulse 90   Temp 98.8 °F (37.1 °C) (Axillary)   Resp 27   Ht 6' (1.829 m)   Wt 294 lb 3.2 oz (133.4 kg)   SpO2 (!) 87%   BMI 39.90 kg/m²   Temp (24hrs), Av.5 °F (36.9 °C), Min:98.1 °F (36.7 °C), Max:98.8 °F (37.1 °C)    No results for input(s): POCGLU in the last 72 hours. I/O (24Hr): Intake/Output Summary (Last 24 hours) at 11/10/2021 1544  Last data filed at 11/10/2021 1233  Gross per 24 hour   Intake 300 ml   Output 2575 ml   Net -2275 ml       Labs:  Hematology:  Recent Labs     21  0534 21  0527 11/10/21  0531   WBC 13.4* 16.0* 17.6*   RBC 5.23 5.28 5.48   HGB 15.6 16.0 16.6   HCT 47.4 47.7 50.2   MCV 90.6 90.3 91.6   MCH 29.8 30.3 30.3   MCHC 32.9 33.5 33.1   RDW 13.7 13.7 13.6    238 See Reflexed IPF Result   MPV 11.2 11.2 NOT REPORTED     Chemistry:  Recent Labs     21  0534 21  1128 11/10/21  0531   * 131* 130*   K 4.2 4.2 4.4   CL 90* 94* 93*   CO2 21 24 26   GLUCOSE 137* 110* 120*   BUN 18 18 17   CREATININE 0.86 0.80 0.90   MG  --  2.5  --    ANIONGAP 16 13 11   LABGLOM >60 >60 >60   GFRAA >60 >60 >60   CALCIUM 8.6 8.7 8.6     Recent Labs     21  0534 21  1128 11/10/21  0531   PROT 6.4 6.4 6.2*   LABALBU 3.6 3.7 3.6   * 93* 73*   * 161* 134*   ALKPHOS 96 100 104   BILITOT 0.65 0.69 0.79     ABG:  Lab Results   Component Value Date    POCPH 7.503 2021    POCPCO2 32.0 2021    POCPO2 50.9 2021    POCHCO3 25.2 2021    NBEA NOT REPORTED 2021    PBEA 3 2021    AZN0FIO NOT REPORTED 2021    KLYP4QJT 89 2021    FIO2 70.0 2021     No results found for: SPECIAL  No results found for: CULTURE    Radiology:  XR CHEST (SINGLE VIEW FRONTAL)    Result Date: 11/3/2021  Scattered pulmonary opacities consistent with multifocal airspace disease.      XR CHEST PORTABLE    Result Date: 2021  Increased bilateral airspace opacities suggesting worsening COVID pneumonia     XR CHEST PORTABLE    Result Date: 11/3/2021  No cardiomegaly or interstitial edema. Patchy bilateral perihilar and left lower lobe pneumonia like infiltrate was noted. Partial clearing has occurred bilaterally since 11/03/2021, 1157 hours. No pneumothorax. Physical Examination:        General appearance:  alert, cooperative and on high flow  Mental Status:  oriented to person, place and time and normal affect  Lungs:  cdistant breath sounds   Heart:  regular rate and rhythm, no murmur  Abdomen:  soft, nontender, nondistended, normal bowel sounds  Extremities:  no edema, redness, tenderness in the calves  Skin:  no gross lesions, rashes, induration    Assessment:        Hospital Problems           Last Modified POA    * (Principal) Acute respiratory failure due to COVID-19 ([address] 11/3/2021 Yes    Benign prostatic hyperplasia with lower urinary tract symptoms 11/3/2021 Yes    Obesity (BMI 35.0-39.9 without comorbidity) 11/3/2021 Yes    Acquired hypothyroidism 11/4/2021 Yes    Obstructive sleep apnea syndrome 11/4/2021 Yes          Plan:        1. Acute respiratory failure secondary to COVID-19.  high dose decadron Actemra 11/3/2021. Appreciate infectious disease pulmonary recommendations. Decadron high dose  2. Continue incentive spirometry  3. Hypothyroidism continue Synthroid  4. GERD continue Protonix  5. Hypertension Coreg, monitor blood pressure  6. BPH. Flomax  7. PT/OT    Limited CODE STATUS. Patient reports that she does not want to be intubated at any cost.  Patient is okay with CPR and cardiac resuscitation. Patient was explained in detail how at the time of the respiratory failure if we DO NOT INTUBATE then she may end up with cardiac arrest.  Patient was explained that even if he tried to resuscitate her from just cardiac point of view it may be a futile effort as underlying respiratory issue is not being addressed.   Patient understood the entire process

## 2021-11-10 NOTE — PROGRESS NOTES
Pt. Wife in [address] updated to [address]. All questions answered. She states pt's daughter's name is Jovita and she calls here for updates. Writer looked in chart for number and it is not in the chart.

## 2021-11-10 NOTE — PROGRESS NOTES
Patient was satting in the low to mid 80s while asleep. Patient refused the bipap. Respiratory put a non-rebreather mask on him along with his high flow to see if that would help. Patient is currently satting in the high 80s to low 90s while sleeping. Will continue to monitor.

## 2021-11-10 NOTE — PROGRESS NOTES
PULMONARY & CRITICAL CARE MEDICINE PROGRESS NOTE     Patient:  Art Du  MRN: [id number]  Admit date: 11/3/2021  Primary Care Physician: Demetrius Peña DO  Consulting Physician: Toñito Bunn MD  CODE Status: Limited  LOS: 7     SUBJECTIVE     CHIEF COMPLAINT/REASON FOR INITIAL CONSULT:   Acute respiratory failure/COVID-19 pneumonia    BRIEF HOSPITAL COURSE:   The patient is a 60 y.o. male history of obesity, diabetes, hypertension, hyperlipidemia was recently diagnosed to have COVID-19. He went to the infusion center for antibody cocktail. However he was found to be hypoxemic with oxygen saturation in 60s. He was sent to the ER and hospitalized for COVID-19 pneumonia. He was initially on nasal cannula. Subsequently needed initiation of BiPAP. At the time of my evaluation patient was on high flow nasal cannula. Sitting comfortably in the bed. He denies any significant dyspnea. Has productive cough with sputum. He denies any history of sleep apnea. INTERVAL HISTORY:  11/10/21    Overnight events noted, chart reviewed, labs and medications reviewed. T-max of 98.8 in last 24 hours he is hemodynamically stable heart rate is usually below 90s respiratory rate is in mid to high 20s. He continues to require high flow nasal cannula 60 L and 95% and usually saturating above 90% intermittently he does require nonrebreather over the high flow nasal cannula. He continued to refuse BiPAP and had not been using BiPAP. Labs show sodium 130 BUN is 17 creatinine 0.90 bicarbonate 26 potassium is 4.4 WBC 17.6 hemoglobin 16.6 hematocrit 50 platelets 241. Chest x-ray on 11/09/2021 seen which shows bilateral infiltrate but slightly more prominent in left lower lung field as compared to chest x-ray on 11/06/2021.     According patient he had history of smoking in the past per patient only for 5-6 years quit few years ago never diagnosed with COPD or asthma but he was having some symptoms and was started on Trelegy once daily by primary care physician few months ago    REVIEW OF SYSTEMS:  Review of Systems   Constitutional: Positive for appetite change. Negative for fatigue and fever. HENT: Negative for postnasal drip, sinus pain, sore throat, trouble swallowing and voice change. Eyes: Negative for photophobia, redness and visual disturbance. Respiratory: Positive for cough and shortness of breath. Negative for wheezing. Cardiovascular: Negative for chest pain, palpitations and leg swelling. Gastrointestinal: Negative for abdominal pain, blood in stool, diarrhea and vomiting. Endocrine: Negative for polydipsia, polyphagia and polyuria. Genitourinary: Negative for dysuria, frequency and hematuria. Musculoskeletal: Negative. Allergic/Immunologic: Negative. Neurological: Negative for dizziness, syncope, speech difficulty and headaches. Hematological: Negative for adenopathy. Does not bruise/bleed easily. Psychiatric/Behavioral: Negative. OBJECTIVE     PaO2/FiO2 RATIO:  No results for input(s): POCPO2 in the last 72 hours. FiO2 : 94 %     VITAL SIGNS:   LAST:  /76   Pulse 90   Temp 98.8 °F (37.1 °C) (Axillary)   Resp 27   Ht 6' (1.829 m)   Wt 294 lb 3.2 oz (133.4 kg)   SpO2 (!) 87%   BMI 39.90 kg/m²   8-24 HR RANGE:  TEMP Temp  Av.4 °F (36.9 °C)  Min: 97.9 °F (36.6 °C)  Max: 98.8 °F (52.8 °C)   BP Systolic (88WZS), YIL:537 , Min:116 , SNO:837      Diastolic (23DTU), BL, Min:55, Max:81     PULSE Pulse  Av.8  Min: 71  Max: 103   RR Resp  Av  Min: 22  Max: 27   O2 SAT SpO2  Av %  Min: 87 %  Max: 98 %   OXYGEN DELIVERY O2 Flow Rate (L/min)  Av L/min  Min: 60 L/min  Max: 60 L/min        SYSTEMIC EXAMINATION:   General appearance - Ill-appearing, mild respiratory distress/mildly tachypneic  Mental status - awake & alert, follows commands  Eyes - pupils equal and reactive, sclera anicteric  Mouth - mucous membranes moist, pharynx normal without lesions.   Large tongue  Neck - supple, no significant adenopathy, carotids upstroke normal bilaterally, no bruits  Chest - Breath sounds bilaterally were dimnished to auscultation at bases. There were  crackles present at bases, no wheezes, rhonchi. There is no intercostal recession or use of accessory muscles  Heart - normal rate, regular rhythm, normal S1, S2, no murmurs, rubs, clicks or gallops  Abdomen - soft, nontender, nondistended, no masses or organomegaly  Neurological - non-focal.  Cranial nerves intact no gross motor neuro deficit  Extremities - peripheral pulses normal, mild pedal edema, no clubbing or cyanosis  Skin - normal coloration and turgor, no rashes, no suspicious skin lesions noted     DATA REVIEW     Medications: Current Inpatient  Scheduled Meds:   [START ON 11/11/2021] dexamethasone  10 mg IntraVENous Q24H    sodium chloride flush  5-40 mL IntraVENous 2 times per day    enoxaparin  30 mg SubCUTAneous BID    carvedilol  6.25 mg Oral BID    pantoprazole  40 mg Oral QAM AC    levothyroxine  150 mcg Oral Daily    [Held by provider] rosuvastatin  40 mg Oral Nightly    tamsulosin  0.4 mg Oral Daily     Continuous Infusions:   sodium chloride         INPUT/OUTPUT:  In: 300 [P.O.:300]  Out: 2938 [Urine:2575]  Date 11/10/21 0000 - 11/10/21 2359   Shift 9679-1116 5328-9244 9088-7669 24 Hour Total   INTAKE   P.O.(mL/kg/hr)  300  300   Shift Total(mL/kg)  300(2.2)  300(2.2)   OUTPUT   Urine(mL/kg/hr) 650(0.6) 1300  1950   Shift Total(mL/kg) 650(4.9) 1300(9.7)  1950(14.6)   Weight (kg) 133.4 133.4 133.4 133.4        LABS:  ABGs:   No results for input(s): POCPH, POCPCO2, POCPO2, POCHCO3, TSZV1BIQ in the last 72 hours.   CBC:   Recent Labs     11/08/21  0534 11/09/21  0527 11/10/21  0531   WBC 13.4* 16.0* 17.6*   HGB 15.6 16.0 16.6   HCT 47.4 47.7 50.2   MCV 90.6 90.3 91.6    238 See Reflexed IPF Result   LYMPHOPCT 6* 4* 4*   RBC 5.23 5.28 5.48   MCH 29.8 30.3 30.3   MCHC 32.9 33.5 33.1   RDW 13.7 13.7 13.6     CRP:   No results for input(s): CRP in the last 72 hours. LDH:   No results for input(s): LDH in the last 72 hours. BMP:   Recent Labs     11/08/21  0534 11/09/21  1128 11/10/21  0531   * 131* 130*   K 4.2 4.2 4.4   CL 90* 94* 93*   CO2 21 24 26   BUN 18 18 17   CREATININE 0.86 0.80 0.90   GLUCOSE 137* 110* 120*     Liver Function Test:   Recent Labs     11/08/21  0534 11/09/21  1128 11/10/21  0531   PROT 6.4 6.4 6.2*   LABALBU 3.6 3.7 3.6   * 161* 134*   * 93* 73*   ALKPHOS 96 100 104   BILITOT 0.65 0.69 0.79     Coagulation Profile:   No results for input(s): INR, PROTIME, APTT in the last 72 hours. D-Dimer:  No results for input(s): DDIMER in the last 72 hours. Ferritin:    No results for input(s): FERRITIN in the last 72 hours. Lactic Acid:  No results for input(s): LACTA in the last 72 hours. Cardiac Enzymes:  No results for input(s): CKTOTAL, CKMB, CKMBINDEX, TROPONINI in the last 72 hours. Invalid input(s): TROPONIN, HSTROP  BNP/ProBNP:   No results for input(s): BNP, PROBNP in the last 72 hours. Triglycerides:  No results for input(s): TRIG in the last 72 hours. Microbiology:  Urine Culture:  No components found for: CURINE  Blood Culture:  No components found for: CBLOOD, CFUNGUSBL  Sputum Culture:  No components found for: CSPUTUM  No results for input(s): SPECDESC, SPECIAL, CULTURE, STATUS, ORG, CDIFFTOXPCR, CAMPYLOBPCR, SALMONELLAPC, SHIGAPCR, SHIGELLAPCR, MPNEUG, MPNEUM, LACTOQL in the last 72 hours. No results for input(s): SPUTUM, SPECDESC, SPECIAL, CULTURE, STATUS, ORG, CDIFFTOXPCR, MPNEUM, MPNEUG in the last 72 hours.     Invalid input(s): CURINE, CBLOOD, CFUNGUSBL     Pathology:    Radiology Reports:  XR CHEST (SINGLE VIEW FRONTAL)   Final Result   Slight increase in bilateral interstitial/ground-glass opacities compatible   with history of COVID pneumonia         XR CHEST PORTABLE   Final Result   Patchy interstitial/airspace opacities again seen in the lungs, not   significantly changed from the prior study, compatible with COVID-19   pneumonia. XR CHEST PORTABLE   Final Result   Increased bilateral airspace opacities suggesting worsening COVID pneumonia         XR CHEST PORTABLE   Final Result   No cardiomegaly or interstitial edema. Patchy bilateral perihilar and left lower lobe pneumonia like infiltrate was   noted. Partial clearing has occurred bilaterally since 11/03/2021, 1157   hours. No pneumothorax. XR CHEST (SINGLE VIEW FRONTAL)   Final Result   Scattered pulmonary opacities consistent with multifocal airspace disease. Echocardiogram:   No results found for this or any previous visit. ASSESSMENT AND PLAN     Assessment:    // Acute hypoxic respiratory failure  // Acute respiratory distress syndrome  // Bilateral multifocal pneumonia due to COVID 19 infection  // Sepsis due to COVID 19  // Probable obstructive sleep apnea  // Hypertension  // Diabetes mellitus  // Morbid obesity    Plan:    I personally interviewed/examined the patient; reviewed interval history, interpreted all available radiographic and laboratory data at the time of service.  Patient remains on high flow nasal cannula and he is on 60 L 95 % and saturating above 90 %.  He does have desaturation  with slight ambulation and out bed to chair. He also require intermittently nonrebreather over the high flow nasal cannula   Continue to encourage nocturnal NIV/BiPAP and intermittently during the daytime.   So far he had refused   Continue supplemental oxygen to keep oxygen saturation >90%   Received Actemra 11/03/2021   He is on IV Decadron per hospital protocol currently on 10 mg once daily finished 20 mg once daily for 5 days   Encourage prone position when sleeping, incentive spirometry   Continue pulmonary toilet, aspiration precautions and bronchodilators   Consider intermittent diuresis with monitoring of intake and output electrolytes and sodium   Monitor I/O, electrolytes with a goal of negative fluid balance   Chemical DVT prophylaxis as per protocol on Lovenox 30 mg twice daily   Antimicrobials reviewed; currently not on antibiotic   Monitor CRP, LDH, AST/ALT,  periodically   Physical/occupational therapy   Continue to watch closely as he is on high flow nasal cannula maximum of 95% and 60 L and intermittently require nonrebreather. Watch for increased work of breathing worsening hypoxia. The patient is/remains critically ill with illness/injury that acutely impairs one or more vital organ systems, such that there is a high probability of imminent or life threatening deterioration in the patient's condition. Critical care time of 35 minutes was spent (excluding procedures), in coordination of care during bedside rounds and discussion of patient care in detail, and recommendations of the team were adopted in the plan. Necessity of all invasive devices was also confirmed. Brian Richter MD   Pulmonary and Critical Care Medicine           11/10/2021, 12:39 PM    This patient was evaluated in the context of the global SARS-CoV-2 (COVID-19) pandemic, which necessitated considerations that the patient either has COVID-19 infection or is at risk of infection with COVID-19. Institutional protocols and algorithms that pertain to the evaluation & management of patients with COVID-19 or those at risk for COVID-19 are in a state of rapid changes based on information released by regulatory bodies including the CDC and federal and state organizations. These policies and algorithms were followed during the patient's care. Please note that this chart was generated using voice recognition Dragon dictation software. Although every effort was made to ensure the accuracy of this automated transcription, some errors in transcription may have occurred.

## 2021-11-10 NOTE — PROGRESS NOTES
Pt called writer to room and states he is having a hard time breathing with the non-rebreather on . Dr. Lauri morel.

## 2021-11-11 NOTE — FLOWSHEET NOTE
Pt. Daughter [facility] up to see patient post death. Pastoral care with family to provide support. Patient wallet given to wife and the rest of his belongings were sent to the morgue with patient. Family has yet to decide on  home will let us know when a decision has been made.

## 2021-11-11 NOTE — FLOWSHEET NOTE
Assessment:  Patient is a male in room 0162 [id number]. Patient  at [phone] on 11/10/21.  met patient's daughter  Sherry Luna  in [facility].  escorted daughter (and friend Ingrid Horn) to her mother's room 0304, and subsequently took  her to the room of her  father Aydin Mcconnell 3006. Outcome:  Daughter and mother expressed gratitude for emotional and spiritual support. Plan:  Chaplains may be paged  via [address] [place] [address]. [phone]   Encounter Summary   Services provided to: Family   Referral/Consult From: Nurse; [address] [place]; Family members   Continue Visiting   (21)   Complexity of Encounter High   Length of Encounter 1 hour   Spiritual Assessment Completed Yes   Routine   Type Follow up   Assessment Tearful; Grieving;  Anxious   Intervention Active listening; Prayer; Sustaining presence/ Ministry of presence   Outcome Expressed gratitude

## 2021-11-11 NOTE — FLOWSHEET NOTE
Intubation discussed at length with patient and patient wife at bedside. Anesthesia CRNA Seal at bedside for intubation. Patient given 140mg of succ and 100mg of etomidate at 2114 per anesthesia Seal verbal order. ETT Tube in at 2115.  Patient torri down from 120's to 40s and atropine given per verbal order CRNA seal.

## 2021-11-11 NOTE — FLOWSHEET NOTE
SPIRITUAL CARE DEPARTMENT - [facility] [address]  PROGRESS NOTE    Shift date: 11/10/21  Shift day: Wednesday   Shift # 2    Room # 2760/6647-35   Name: Radha Greer            Age: 60 y.o. Gender: male          Latter-day: Unknown   Place of Faith: Unknown    Referral: Code Blue    Admit Date & Time: 11/3/2021 10:59 AM    PATIENT/EVENT DESCRIPTION:  Radha Greer is a 60 y.o. male in rm 3006 of CAR - 3. SPIRITUAL ASSESSMENT/INTERVENTION:   responded to a perfect serve code blue. Upon arrival,  was able to connect with spouse named Vickie Garcia. Pt has Covid and was intubated. Pt then coded. Medical are intervening now. Vickie Garcai also has Covid and has been admitted to 3004. SPIRITUAL CARE FOLLOW-UP PLAN:  Chaplains will remain available to offer spiritual and emotional support as needed.  has been present with the Spouse. Electronically signed by Amparo Ferrari Resident, on 11/10/2021 at 9:47 PM.  [address]  [phone]       11/10/21 0253   Encounter Summary   Services provided to: Family   Referral/Consult From: Multi-disciplinary team   Support System Spouse   Continue Visiting   (11/10/21)   Complexity of Encounter High   Length of Encounter 30 minutes   Spiritual Assessment Completed Yes   Routine   Type Initial   Assessment Unable to respond; Coping; Helplessness; Anxious; Fearful   Intervention Active listening; Sustaining presence/ Ministry of presence;  Explored feelings, thoughts, concerns   Outcome Comfort; Engaged in conversation; Did not respond

## 2021-11-11 NOTE — FLOWSHEET NOTE
Pt noncompliant with bipap. Decision made to intubate. EMILY Shazia Doing at bedside.    2120- CPR started  2121- 1mg Epi IV Push  2122-Pulse Check, Asystole, Resume CPR  2123-1mg Epi given IV Push   1 Amp bicarb   1 Gram Calcium IV push   2124- Pulse check-ROSC- Sinus Tachycardia  2137- Pulse lost, CPR Began   Vec 10mg given, Prop IV push per CRNA   1mg Epi given IV push  2138- 1 Gram Calcium IV push  2139- ROSC-V-fib  2143- 300 Amio given IV Push  2144- CPR started   1mg Epi given IV Push  2146- Pulse Check-Asystole  2147- 1mg Epi given IV Push   Epi Gtt started at 5  2148- Rosc obtained- Sinus Tach   Epi gtt increased to 10  2152- Pulse lost, Resumed CPR   Levophed gtt increased to 30   Epi gtt increased to 30  2154- 1mg Epi given IV Push   Pulse Check- PEA   1 Amp bicarb  2156- Pulse Check- Asystole   Dr. Angelica Ferguson updating pt's wife in [facility] on patient's status  2157- 1mg Epi given IV Push  2158- Pulse Check- Asystole  2159-  1 Gram Calcium IV push  2200- Pulse Check- Asystole  2203- Magnesium IV Push  2202- Pulse Check- Asystole  2204- Pulse Check-Asystole, Compressions stopped per patients wife's request.   TOD- 2204, Pronounced by Dr. Debbie Goode

## 2021-11-11 NOTE — FLOWSHEET NOTE
[facility]   Patient Death Note  DEATH   Shift date: 11/10/21    Shift day: Wednesday  Shift #: 2                 Room # 7314/1471-07   Name: Re Doyle            Age: 60 y.o. Gender: male          Anabaptist: Unknown    Place of Yarsanism: Unknown  Admit Date & Time: 11/3/2021 10:59 AM     Referral: Nurse   Actual date of death: 11/10/21   TOD: 2204       SITUATION AT DEATH:  Acute Respiratory Failure Due to Covid. IS THIS A 'S CASE? No    SPIRITUAL/EMOTIONAL INTERVENTION:   responded to perfect serve code blue. Medical brought back Pt at least three times onsite. Ultimately, Spouse made call to stop CPR Compressions. Pt .  provided Pt's spouse with prayer cards, prayers, and a blanket made by volunteers. Spouse is in emotional distraught. Spouse explained how she would need additional time to pick out a mortuary  home. Family Received Grief Packet? Yes       NAME AND PHONE NUMBER OF DOCTOR SIGNING DEATH CERTIFICATE: Dr. Dax Rasheed [phone]       met with Pt's wife who has been admitted to [facility] in [address] - [address]. Pt's wife was present when Pt .  dropped off [facility] options/resources with the Spouse of the Pt. Pt's wife will need to call Spiritual Care and let us know the  home of their choosing. Copy of COMPLETED Release of Body Form Received? No    Patient's belongings: With Patient but Daughter will  belongings.  HOME:  Name: [place]  [place]  Phone Number: TBD    NEXT OF KIN:  Name: Isabell Medina  Relationship: Spouse  Street Address: [facility] [address]: [address]: [address]  Zip code: [address]    Phone Number: [phone]; [phone]     [facility]? Yes    IF SO, WHAT? Get  home from family for  Pt. Fill out top section of the medical release form.     Electronically signed by Michel Walker, on 11/10/2021 at 10:51 PM.  [facility]  [phone]       11/10/21 2250   Encounter Summary   Services provided to: Patient   Referral/Consult From: Multi-disciplinary team   Support System Spouse;  Children   Continue Visiting   (11/10/21)   Complexity of Encounter High   Length of Encounter 45 minutes   Spiritual Assessment Completed Yes   Grief and Life Adjustment   Type Death   Assessment Unable to respond   Intervention Sustaining presence/ Ministry of presence   Outcome Did not respond  (Pt )

## 2021-11-11 NOTE — PLAN OF CARE
[facility]  Office: [address], DO, Claudia Denise, DO, Malindawill Saavedra, DO, Macie Boldenderrick Trevizo, DO, Steve Jacinto MD, Salo Troncoso MD, Sirisha Headley MD, Justo Colon MD, Dasha Laurent MD, Elo Baer MD, Anitha Arroyo MD, Cely Crum, DO, Becky Mercado, DO, Daniel Gaspar MD,  Mariely Mcdonough, DO, Dietrich Halsted, MD, Kristine León MD, Maggy Kaur MD, Carina Calderon MD, Ca Leahy MD, Josué Herrera MD, Jennifer Avitia MD, Jamee Bose [facility], [facility], CNP, Kishan Gardner, CNP, Nery Akbar, CNS, Marc Becerra, CNP, Jamar Whipple, CNP, David Solorzano, CNP, Yahir De Dios, CNP, Waleska Faust, CNP, Keaton Booth, PA-C, Vlad Herrera, [facility], Sergio Manuel, CNP, Murtaza Sanchez, CNP, Mariah Gupta CNP, Kay Mendosa, [facility], Rona Bhardwaj, CNP, Florencia Caldwell, CNP, Margarito [facility], [address] [place] [address] Note    11/10/2021    10:24 PM    Name:   Elba Collado  MRN:     [id number]     Acct:      [de-identified]   Room:   [address] [place] [address] Day:  7  Admit Date:  11/3/2021 10:59 AM    PCP:   Hannah Cm DO  Code Status:  Full Code      Patient was not tolerating Bipap tonight. He kept removing it, per nursing. Apparently, he was starting to desaturate in the 70's. His wife was nearby in another room and she came to talk to him. Patient was initially a Limited Code with No intubation for the past few days. He ultimately changed his mind, wife was present. Nursing called Pulmonary/ CCM and received orders for Ativan, morphine and a Precedex gtt. Pt was intubated around 9pm by anesthesia for worsening respiratory failure. He did not tolerate the sedation and torri'd down. He was given Atropine, then he lost a pulse. A Code Blue was called. CPR and ACLS ensued and I arrived at bedside. Patient given several rounds of Epi, Amio bolus, bicarb, calcium IV.   He did regain a pulse 4 separate times, and was in a tachycardiac rhythm, -170s. An EKG was unable to be completed because he was too sweaty. An arterial line was placed. He had an Epinephrine gtt and IVF initiated. I did go and update his wife that he was not doing well. She asked that we continue for 10 more minutes. He ultimately got 6 rounds of Epi along with IV Magnesium, and did not regain a pulse. His wife asked that we stop the resuscitation efforts. He  at , pulseless, no spont breaths. I updated his wife who was in the hallway. I also called his daughter, Ira Iqbal, and informed her of these events. She plans to visit [facility]. Spoke with .   > 70 min spent at Code, updating events with family and Steve Suarez MD  11/10/2021  10:24 PM

## 2021-11-11 NOTE — ANESTHESIA PROCEDURE NOTES
Airway  Date/Time: 11/10/2021 9:15 PM  Urgency: emergent    Airway not difficult    General Information and Staff    Patient location during procedure: ICU  Resident/CRNA: Glen Dakins, APRN - CRNA    Consent for Airway (if performed for an anesthetic, see related documentation for consents)  Patient identity confirmed: per hospital policy  Consent: Verbal consent obtained (from wife). Risks and benefits: risks, benefits and alternatives were discussed  Consent given by: spouse      Code status verified:yes  Indications and Patient Condition  Indications for airway management: airway protection and respiratory failure  Spontaneous ventilation: present  Sedation level: deep  Preoxygenated: yes  Patient position: sniffing  MILS not maintained throughout  Mask difficulty assessment: not attempted    Final Airway Details  Final airway type: endotracheal airway      Successful airway: ETT  Cuffed: yes   Successful intubation technique: video laryngoscopy  Endotracheal tube insertion site: oral  Blade size: #3  ETT size (mm): 7.5  Cormack-Lehane Classification: grade I - full view of glottis  Placement verified by: chest auscultation and capnometry   Number of attempts at approach: 1  Ventilation between attempts: none  Number of other approaches attempted: 0    Additional Comments  Pt intubated with glide scope. Etomidate 10 mg IV and Anectine 140 mg IV given. Immediately after intubation pt hypotensive. Placed in trendelenburg position and fluid bolus started. Pt became bradycardic and pulses no longer palpable. See ACLS documentation.   no

## 2021-11-11 NOTE — PLAN OF CARE
Problem: Airway Clearance - Ineffective  Goal: Achieve or maintain patent airway  11/10/2021 1947 by Yenifer Carvalho RN  Outcome: Ongoing  11/10/2021 1807 by Christina Dugan RN  Outcome: Ongoing  11/10/2021 1050 by Christina Dugan RN  Outcome: Ongoing  11/10/2021 0604 by Mary Smalls RN  Outcome: Ongoing     Problem: Gas Exchange - Impaired  Goal: Absence of hypoxia  11/10/2021 1947 by Yenifer Carvalho RN  Outcome: Ongoing  11/10/2021 1807 by Christina Dugan RN  Outcome: Ongoing  11/10/2021 1050 by Christina Dugan RN  Outcome: Ongoing  11/10/2021 0604 by Mary Smalls RN  Outcome: Ongoing  Goal: Promote optimal lung function  11/10/2021 1947 by Yenifer Carvalho RN  Outcome: Ongoing  11/10/2021 1807 by Christina Dugan RN  Outcome: Ongoing  11/10/2021 1050 by Christina Dugan RN  Outcome: Ongoing  11/10/2021 0604 by Mary Smalls RN  Outcome: Ongoing     Problem: Breathing Pattern - Ineffective  Goal: Ability to achieve and maintain a regular respiratory rate  11/10/2021 1947 by Yenifer Carvalho RN  Outcome: Ongoing  11/10/2021 1807 by Christina Dugan RN  Outcome: Ongoing  11/10/2021 1050 by Christina Dugan RN  Outcome: Ongoing  11/10/2021 0604 by Mary Smalls RN  Outcome: Ongoing     Problem:  Body Temperature -  Risk of, Imbalanced  Goal: Ability to maintain a body temperature within defined limits  11/10/2021 1947 by Yenifer Carvalho RN  Outcome: Ongoing  11/10/2021 1807 by Christina Dugan RN  Outcome: Ongoing  11/10/2021 1050 by Christina Dugan RN  Outcome: Ongoing  11/10/2021 0604 by Mary Smalls RN  Outcome: Ongoing  Goal: Will regain or maintain usual level of consciousness  11/10/2021 1947 by Yenifer Carvalho RN  Outcome: Ongoing  11/10/2021 1807 by Christina Dugan RN  Outcome: Ongoing  11/10/2021 1050 by Christina Dugan RN  Outcome: Ongoing  11/10/2021 0604 by Mary Smalls RN  Outcome: Ongoing  Goal: Complications related to the disease process, condition or treatment will be avoided or minimized  11/10/2021 1947 by Jose A Jamison RN  Outcome: Ongoing  11/10/2021 1807 by Kriss Soni RN  Outcome: Ongoing  11/10/2021 1050 by Kriss Soni RN  Outcome: Ongoing  11/10/2021 0604 by Margretta Siemens, RN  Outcome: Ongoing     Problem: Isolation Precautions - Risk of Spread of Infection  Goal: Prevent transmission of infection  11/10/2021 1947 by Jose A Jamison RN  Outcome: Ongoing  11/10/2021 1807 by Kriss Soni RN  Outcome: Ongoing  11/10/2021 1050 by Kriss Soni RN  Outcome: Ongoing  11/10/2021 0604 by Margretta Siemens, RN  Outcome: Ongoing     Problem: Nutrition Deficits  Goal: Optimize nutritional status  11/10/2021 1947 by Jose A Jamison RN  Outcome: Ongoing  11/10/2021 1807 by Kriss Soni RN  Outcome: Ongoing  11/10/2021 1050 by Kriss Soni RN  Outcome: Ongoing  11/10/2021 0604 by Margretta Siemens, RN  Outcome: Ongoing     Problem: Risk for Fluid Volume Deficit  Goal: Maintain normal heart rhythm  11/10/2021 1947 by Jose A Jamison RN  Outcome: Ongoing  11/10/2021 1807 by Kriss Soni RN  Outcome: Ongoing  11/10/2021 1050 by Kriss Soni RN  Outcome: Ongoing  11/10/2021 0604 by Margretta Siemens, RN  Outcome: Ongoing  Goal: Maintain absence of muscle cramping  11/10/2021 1947 by Jose A Jamison RN  Outcome: Ongoing  11/10/2021 1807 by Kriss Soni RN  Outcome: Ongoing  11/10/2021 1050 by Kriss Soni RN  Outcome: Ongoing  11/10/2021 0604 by Margretta Siemens, RN  Outcome: Ongoing  Goal: Maintain normal serum potassium, sodium, calcium, phosphorus, and pH  11/10/2021 1947 by Jose A Jamison RN  Outcome: Ongoing  11/10/2021 1807 by Kriss Soni RN  Outcome: Ongoing  11/10/2021 1050 by Kriss Soni RN  Outcome: Ongoing  11/10/2021 0604 by Margretta Siemens, RN  Outcome: Ongoing     Problem: Loneliness or Risk for Loneliness  Goal: Demonstrate positive use of time alone when socialization is not possible  11/10/2021 1947 by Jose A Jamison RN  Outcome: Ongoing  11/10/2021 1807 by Tonio Shoemaker RN  Outcome: Ongoing  11/10/2021 1050 by Tonio Shoemaker RN  Outcome: Ongoing  11/10/2021 0604 by Yanni Teran RN  Outcome: Ongoing     Problem: Fatigue  Goal: Verbalize increase energy and improved vitality  11/10/2021 1947 by Nathen Olszewski, RN  Outcome: Ongoing  11/10/2021 1807 by Tonio Shoemaker RN  Outcome: Ongoing  11/10/2021 1050 by Tonio Shoemaker RN  Outcome: Ongoing  11/10/2021 0604 by Yanni Teran RN  Outcome: Ongoing     Problem: SAFETY  Goal: Free from accidental physical injury  11/10/2021 1947 by Nathen Olszewski, RN  Outcome: Ongoing  11/10/2021 1807 by Tonio Shoemaker RN  Outcome: Ongoing  11/10/2021 1050 by Tonio Shoemaker RN  Outcome: Ongoing  11/10/2021 0604 by Yanni Teran RN  Outcome: Ongoing  Goal: Free from intentional harm  11/10/2021 1947 by Nathen Olszewski, RN  Outcome: Ongoing  11/10/2021 1807 by Tonio Shoemaker RN  Outcome: Ongoing  11/10/2021 1050 by Tonio Shoemaker RN  Outcome: Ongoing  11/10/2021 0604 by Yanni Teran RN  Outcome: Ongoing     Problem: Patient Education: Go to Patient Education Activity  Goal: Patient/Family Education  11/10/2021 1947 by Nathen Olszewski, RN  Outcome: Ongoing  11/10/2021 1807 by Tonio Shoemaker RN  Outcome: Ongoing  11/10/2021 1050 by Tonio Shoemaker RN  Outcome: Ongoing  11/10/2021 0604 by Yanni Teran RN  Outcome: Ongoing     Problem: DAILY CARE  Goal: Daily care needs are met  11/10/2021 1947 by Nathen Olszewski, RN  Outcome: Ongoing  11/10/2021 1807 by Tonio Shoemaker RN  Outcome: Ongoing  11/10/2021 1050 by Tonio Shoemaker RN  Outcome: Ongoing  11/10/2021 0604 by Yanni Teran RN  Outcome: Ongoing     Problem: PAIN  Goal: Patient's pain/discomfort is manageable  11/10/2021 1947 by Nathen Olszewski, RN  Outcome: Ongoing  11/10/2021 1807 by Tonio Shoemaker RN  Outcome: Ongoing  11/10/2021 1050 by Tonio Shoemaker RN  Outcome: Ongoing  11/10/2021 0604 by Yanni Teran RN  Outcome: Ongoing     Problem: SKIN INTEGRITY  Goal: Skin integrity is maintained or improved  11/10/2021 1947 by Justin Prajapati RN  Outcome: Ongoing  11/10/2021 1807 by Lizzette Boland RN  Outcome: Ongoing  11/10/2021 1050 by Lizzette Boland RN  Outcome: Ongoing  11/10/2021 0604 by Fidelia Marc RN  Outcome: Ongoing     Problem: KNOWLEDGE DEFICIT  Goal: Patient/S.O. demonstrates understanding of disease process, treatment plan, medications, and discharge instructions.   11/10/2021 1947 by Justin Prajapati RN  Outcome: Ongoing  11/10/2021 1807 by Lizzette Boland RN  Outcome: Ongoing  11/10/2021 1050 by Lizzette Boland RN  Outcome: Ongoing  11/10/2021 0604 by Fidelia Marc RN  Outcome: Ongoing     Problem: DISCHARGE BARRIERS  Goal: Patient's continuum of care needs are met  11/10/2021 1947 by Justin Prajapati RN  Outcome: Ongoing  11/10/2021 1807 by Lizzette Boland RN  Outcome: Ongoing  11/10/2021 1050 by Lizzette Boland RN  Outcome: Ongoing  11/10/2021 0604 by Fidelia Marc RN  Outcome: Ongoing     Problem: OXYGENATION/RESPIRATORY FUNCTION  Goal: Patient will maintain patent airway  11/10/2021 1947 by Justin Prajapati RN  Outcome: Ongoing  11/10/2021 1807 by Lizzette Boland RN  Outcome: Ongoing  11/10/2021 1050 by Lizzette Boland RN  Outcome: Ongoing  11/10/2021 0604 by Fidelia Marc RN  Outcome: Ongoing  Goal: Patient will achieve/maintain normal respiratory rate/effort  Description: Respiratory rate and effort will be within normal limits for the patient  11/10/2021 1947 by Justin Prajapati RN  Outcome: Ongoing  11/10/2021 1807 by Lizzette Boland RN  Outcome: Ongoing  11/10/2021 1050 by Lizzette Boland RN  Outcome: Ongoing  11/10/2021 0604 by Fidelia Marc RN  Outcome: Ongoing

## 2021-11-11 NOTE — PROGRESS NOTES
Dr. Kristine West paged for low O2 saturations and non-compliance with BIPAP. Spoke with daughter Francisco Bills on the phone and updated her the patient's deteriorating condition. Daughter was understanding.

## 2021-11-11 NOTE — SIGNIFICANT EVENT
Responded to call of [employer]. Arrived at bedside with CPR in progress. Patient has been intubated shortly beforehand. Had already had 2 rounds of CPR on my arrival.  No cardiac drugs have been given at that time. Patient had ROSC at the next pulse check. Post ROSC rhythm was tachycardic heart rate in the 150s to 170s. Post ROSC care with IV fluids and epinephrine drip were started. Attempted to obtain EKG to further evaluate, but this was delayed due to patient's level of diaphoresis. Arterial line was placed. Patient went into PEA arrest again shortly thereafter. CPR was initiated again. We briefly regained pulses an additional 3 times, but patient became pulseless again in less than 1minute following ROSC. Received multiple rounds of epinephrine, as well as calcium chloride, sodium bicarbonate amiodarone and magnesium throughout this process. Efforts to regain pulses were increasingly futile and patient's wife assess to stop resuscitative efforts. Time of death was called at [phone].

## 2021-11-16 LAB
CULTURE: NORMAL
CULTURE: NORMAL
Lab: NORMAL
Lab: NORMAL
SPECIMEN DESCRIPTION: NORMAL
SPECIMEN DESCRIPTION: NORMAL

## 2021-11-23 NOTE — DISCHARGE SUMMARY
[facility]  Office: [address], DO, Ritchie Hilton, DO, Yue Singh, DO, Ree Eugene Blood, DO, Ermelinda Shafer MD, Roby Thomason MD, Samantha Resendiz MD, Qian Erickson MD, Perico Bautista MD, Zohreh Olivarez MD, Baldev Johnston MD, Hang Alejo, DO, Diana Lucas, DO, Radha Gillette MD,  Molly Carrasco DO, [place] MD, Dyllan Berg MD, Nate Orourke MD, Shelton Moreno MD, Katja Andres MD, Andreea Rodriguez MD, Chanelle Wheeler MD, Shnata Whitney, [facility], [facility], CNP, Davon Reyes, CNP, Terry Mendoza, CNS, Clarisse Marie, Michelet Cade, CNP, Ryan Turner, CNP, Sebastien Urias, CNP, Annika Frederick, CNP, Bhavesh Mcgraw PA-C, Princess Beavers, [facility], Jessica Capellan, CNP, Beata Alvarez, CNP, Yecenia Quiroz, CNP, Kade Olsen, CNP, Donavan Chua, CNP, Josefa Cullen, [facility], Stephanie Wilson, [facility]    Discharge Summary     Patient ID: Charmaine John  :  1965   MRN: [id number]     ACCOUNT:  [de-identified]   Patient's PCP: Clark Long DO  Admit Date: 11/3/2021   Discharge Date: 11/10/2021     Length of Stay: 8  Code Status:  Prior  Admitting Physician: No admitting provider for patient encounter. Discharge Physician: Dyllan Berg MD     Active Discharge Diagnoses:     Hospital Problem Lists:  Principal Problem:    Acute respiratory failure due to COVID-19 [facility])  Active Problems:    Benign prostatic hyperplasia with lower urinary tract symptoms    Obesity (BMI 35.0-39.9 without comorbidity)    Acquired hypothyroidism    Obstructive sleep apnea syndrome  Resolved Problems:    * No resolved hospital problems. *      Admission Condition:  poor     Discharged Condition:     Hospital Stay:     Hospital Course: This was a 78-year-old male who was admitted with worsening shortness of breath after being positive for COVID-19 pneumonia.   Throughout the course of his hospital stay patient initially chose to 11/10/2021    LABGLOM >60 11/10/2021    GFRAA >60 11/10/2021    GFR      11/10/2021    GFR NOT REPORTED 11/10/2021    PROT 6.2 11/10/2021    CALCIUM 8.6 11/10/2021     PT/INR:    Lab Results   Component Value Date    PROTIME 10.2 2021    INR 0.9 2021     PTT: No results found for: APTT  FLP:  No results found for: CHOL, TRIG, HDL  U/A:  No results found for: Mahendra Dad, SPECGRAV, HGBUR, PHUR, PROTEINU, GLUCOSEU, KETUA, BILIRUBINUR, UROBILINOGEN, NITRU, LEUKOCYTESUR  TSH:  No results found for: TSH     Radiology:  No results found. Consultations:    Consults:     Final Specialist Recommendations/Findings:   IP CONSULT TO HOSPITALIST  IP CONSULT TO INFECTIOUS DISEASES  IP CONSULT TO INFECTIOUS DISEASES  IP CONSULT TO PULMONOLOGY      The patient was seen and examined on day of discharge and this discharge summary is in conjunction with any daily progress note from day of discharge.     Discharge plan:     Disposition:     Physician Follow Up:       Requiring Further Evaluation/Follow Up POST HOSPITALIZATION/Incidental Findings:     Diet:     Activity:     Instructions to Patient:     Discharge Medications:      Medication List      ASK your doctor about these medications    allopurinol 300 MG tablet  Commonly known as: ZYLOPRIM     cariprazine hcl 1.5 MG capsule  Commonly known as: VRAYLAR     carvedilol 6.25 MG tablet  Commonly known as: COREG     co-enzyme Q-10 30 MG capsule     esomeprazole 20 MG delayed release capsule  Commonly known as: NEXIUM     levocetirizine 5 MG tablet  Commonly known as: XYZAL     Levothyroxine Sodium 150 MCG Caps     Melatonin 10 MG Tabs     olmesartan-amLODIPine-HCTZ 40-10-25 MG Tabs     ondansetron 4 MG disintegrating tablet  Commonly known as: ZOFRAN-ODT     potassium citrate 10 MEQ (1080 MG) extended release tablet  Commonly known as: UROCIT-K     rosuvastatin 40 MG tablet  Commonly known as: CRESTOR     tamsulosin 0.4 MG capsule  Commonly known as: FLOMAX     Trelegy Ellipta 100-62.5-25 MCG/INH Aepb  Generic drug: fluticasone-umeclidin-vilant            No discharge procedures on file. Time Spent on discharge is  40 mins in patient examination, evaluation, counseling as well as medication reconciliation, prescriptions for required medications, discharge plan and follow up. Electronically signed by   Ej Pearson MD  11/23/2021  2:33 PM      Thank you Dr. Damaris Ga DO for the opportunity to be involved in this patient's care.